# Patient Record
Sex: FEMALE | Employment: FULL TIME | URBAN - METROPOLITAN AREA
[De-identification: names, ages, dates, MRNs, and addresses within clinical notes are randomized per-mention and may not be internally consistent; named-entity substitution may affect disease eponyms.]

---

## 2020-02-17 PROBLEM — Z34.00 SUPERVISION OF NORMAL FIRST PREGNANCY (HCC): Status: ACTIVE | Noted: 2020-02-17

## 2020-02-17 PROBLEM — Z34.00 SUPERVISION OF NORMAL FIRST PREGNANCY: Status: ACTIVE | Noted: 2020-02-17

## 2020-06-03 PROBLEM — Z67.91 RH NEGATIVE STATUS DURING PREGNANCY: Status: ACTIVE | Noted: 2020-06-03

## 2020-06-03 PROBLEM — O26.899 RH NEGATIVE STATUS DURING PREGNANCY: Status: ACTIVE | Noted: 2020-06-03

## 2020-06-03 PROBLEM — Z67.91 RH NEGATIVE STATUS DURING PREGNANCY (HCC): Status: ACTIVE | Noted: 2020-06-03

## 2020-06-03 PROBLEM — O26.899 RH NEGATIVE STATUS DURING PREGNANCY (HCC): Status: ACTIVE | Noted: 2020-06-03

## 2020-08-21 ENCOUNTER — APPOINTMENT (OUTPATIENT)
Dept: OBGYN CLINIC | Facility: HOSPITAL | Age: 25
End: 2020-08-21
Payer: COMMERCIAL

## 2020-08-21 ENCOUNTER — TELEPHONE (OUTPATIENT)
Dept: OBGYN UNIT | Facility: HOSPITAL | Age: 25
End: 2020-08-21

## 2020-08-21 ENCOUNTER — HOSPITAL ENCOUNTER (INPATIENT)
Facility: HOSPITAL | Age: 25
LOS: 2 days | Discharge: HOME | End: 2020-08-23
Attending: OBSTETRICS & GYNECOLOGY | Admitting: OBSTETRICS & GYNECOLOGY
Payer: COMMERCIAL

## 2020-08-21 ENCOUNTER — ANESTHESIA (OUTPATIENT)
Dept: OBGYN UNIT | Facility: HOSPITAL | Age: 25
End: 2020-08-21
Payer: COMMERCIAL

## 2020-08-21 ENCOUNTER — ANESTHESIA EVENT (OUTPATIENT)
Dept: OBGYN UNIT | Facility: HOSPITAL | Age: 25
End: 2020-08-21
Payer: COMMERCIAL

## 2020-08-21 PROBLEM — Z34.90 PREGNANCY: Status: ACTIVE | Noted: 2020-08-21

## 2020-08-21 PROBLEM — Z34.90 PREGNANCY (HCC): Status: ACTIVE | Noted: 2020-08-21

## 2020-08-21 LAB
ANTIBODY SCREEN: NEGATIVE
BASOPHILS # BLD AUTO: 0.06 X10(3) UL (ref 0–0.2)
BASOPHILS NFR BLD AUTO: 0.7 %
DEPRECATED RDW RBC AUTO: 46.5 FL (ref 35.1–46.3)
EOSINOPHIL # BLD AUTO: 0.05 X10(3) UL (ref 0–0.7)
EOSINOPHIL NFR BLD AUTO: 0.6 %
ERYTHROCYTE [DISTWIDTH] IN BLOOD BY AUTOMATED COUNT: 13.8 % (ref 11–15)
HCT VFR BLD AUTO: 36.1 % (ref 35–48)
HGB BLD-MCNC: 12.2 G/DL (ref 12–16)
IMM GRANULOCYTES # BLD AUTO: 0.08 X10(3) UL (ref 0–1)
IMM GRANULOCYTES NFR BLD: 1 %
LYMPHOCYTES # BLD AUTO: 2.01 X10(3) UL (ref 1–4)
LYMPHOCYTES NFR BLD AUTO: 24.1 %
MCH RBC QN AUTO: 31.5 PG (ref 26–34)
MCHC RBC AUTO-ENTMCNC: 33.8 G/DL (ref 31–37)
MCV RBC AUTO: 93.3 FL (ref 80–100)
MONOCYTES # BLD AUTO: 0.55 X10(3) UL (ref 0.1–1)
MONOCYTES NFR BLD AUTO: 6.6 %
NEUTROPHILS # BLD AUTO: 5.58 X10 (3) UL (ref 1.5–7.7)
NEUTROPHILS # BLD AUTO: 5.58 X10(3) UL (ref 1.5–7.7)
NEUTROPHILS NFR BLD AUTO: 67 %
PLATELET # BLD AUTO: 191 10(3)UL (ref 150–450)
RBC # BLD AUTO: 3.87 X10(6)UL (ref 3.8–5.3)
RH BLOOD TYPE: POSITIVE
SARS-COV-2 RNA RESP QL NAA+PROBE: NOT DETECTED
T PALLIDUM AB SER QL IA: NONREACTIVE
WBC # BLD AUTO: 8.3 X10(3) UL (ref 4–11)

## 2020-08-21 PROCEDURE — 85025 COMPLETE CBC W/AUTO DIFF WBC: CPT | Performed by: OBSTETRICS & GYNECOLOGY

## 2020-08-21 PROCEDURE — 0KQM0ZZ REPAIR PERINEUM MUSCLE, OPEN APPROACH: ICD-10-PCS | Performed by: OBSTETRICS & GYNECOLOGY

## 2020-08-21 PROCEDURE — 86850 RBC ANTIBODY SCREEN: CPT | Performed by: OBSTETRICS & GYNECOLOGY

## 2020-08-21 PROCEDURE — 86901 BLOOD TYPING SEROLOGIC RH(D): CPT | Performed by: OBSTETRICS & GYNECOLOGY

## 2020-08-21 PROCEDURE — 86900 BLOOD TYPING SEROLOGIC ABO: CPT | Performed by: OBSTETRICS & GYNECOLOGY

## 2020-08-21 PROCEDURE — 86780 TREPONEMA PALLIDUM: CPT | Performed by: OBSTETRICS & GYNECOLOGY

## 2020-08-21 PROCEDURE — 3E033VJ INTRODUCTION OF OTHER HORMONE INTO PERIPHERAL VEIN, PERCUTANEOUS APPROACH: ICD-10-PCS | Performed by: OBSTETRICS & GYNECOLOGY

## 2020-08-21 RX ORDER — IBUPROFEN 600 MG/1
600 TABLET ORAL EVERY 6 HOURS PRN
Status: DISCONTINUED | OUTPATIENT
Start: 2020-08-21 | End: 2020-08-21

## 2020-08-21 RX ORDER — TRISODIUM CITRATE DIHYDRATE AND CITRIC ACID MONOHYDRATE 500; 334 MG/5ML; MG/5ML
30 SOLUTION ORAL AS NEEDED
Status: DISCONTINUED | OUTPATIENT
Start: 2020-08-21 | End: 2020-08-21

## 2020-08-21 RX ORDER — AMMONIA INHALANTS 0.04 G/.3ML
0.3 INHALANT RESPIRATORY (INHALATION) AS NEEDED
Status: DISCONTINUED | OUTPATIENT
Start: 2020-08-21 | End: 2020-08-21

## 2020-08-21 RX ORDER — TERBUTALINE SULFATE 1 MG/ML
0.25 INJECTION, SOLUTION SUBCUTANEOUS AS NEEDED
Status: DISCONTINUED | OUTPATIENT
Start: 2020-08-21 | End: 2020-08-21

## 2020-08-21 RX ORDER — DOCUSATE SODIUM 100 MG/1
100 CAPSULE, LIQUID FILLED ORAL
Status: DISCONTINUED | OUTPATIENT
Start: 2020-08-21 | End: 2020-08-23

## 2020-08-21 RX ORDER — SIMETHICONE 80 MG
80 TABLET,CHEWABLE ORAL 3 TIMES DAILY PRN
Status: DISCONTINUED | OUTPATIENT
Start: 2020-08-21 | End: 2020-08-23

## 2020-08-21 RX ORDER — ONDANSETRON 2 MG/ML
4 INJECTION INTRAMUSCULAR; INTRAVENOUS EVERY 6 HOURS PRN
Status: DISCONTINUED | OUTPATIENT
Start: 2020-08-21 | End: 2020-08-21

## 2020-08-21 RX ORDER — ACETAMINOPHEN 500 MG
500 TABLET ORAL EVERY 6 HOURS PRN
Status: DISCONTINUED | OUTPATIENT
Start: 2020-08-21 | End: 2020-08-21

## 2020-08-21 RX ORDER — SODIUM CHLORIDE, SODIUM LACTATE, POTASSIUM CHLORIDE, CALCIUM CHLORIDE 600; 310; 30; 20 MG/100ML; MG/100ML; MG/100ML; MG/100ML
INJECTION, SOLUTION INTRAVENOUS CONTINUOUS
Status: DISCONTINUED | OUTPATIENT
Start: 2020-08-21 | End: 2020-08-21

## 2020-08-21 RX ORDER — ACETAMINOPHEN 325 MG/1
650 TABLET ORAL EVERY 6 HOURS PRN
Status: DISCONTINUED | OUTPATIENT
Start: 2020-08-21 | End: 2020-08-23

## 2020-08-21 RX ORDER — BISACODYL 10 MG
10 SUPPOSITORY, RECTAL RECTAL ONCE AS NEEDED
Status: DISCONTINUED | OUTPATIENT
Start: 2020-08-21 | End: 2020-08-23

## 2020-08-21 RX ORDER — EPHEDRINE SULFATE/0.9% NACL/PF 25 MG/5 ML
5 SYRINGE (ML) INTRAVENOUS AS NEEDED
Status: DISCONTINUED | OUTPATIENT
Start: 2020-08-21 | End: 2020-08-21

## 2020-08-21 RX ORDER — DIPHENHYDRAMINE HYDROCHLORIDE 50 MG/ML
12.5 INJECTION INTRAMUSCULAR; INTRAVENOUS EVERY 4 HOURS PRN
Status: DISCONTINUED | OUTPATIENT
Start: 2020-08-21 | End: 2020-08-21

## 2020-08-21 RX ORDER — IBUPROFEN 600 MG/1
600 TABLET ORAL EVERY 6 HOURS
Status: DISCONTINUED | OUTPATIENT
Start: 2020-08-21 | End: 2020-08-23

## 2020-08-21 RX ORDER — DEXTROSE, SODIUM CHLORIDE, SODIUM LACTATE, POTASSIUM CHLORIDE, AND CALCIUM CHLORIDE 5; .6; .31; .03; .02 G/100ML; G/100ML; G/100ML; G/100ML; G/100ML
INJECTION, SOLUTION INTRAVENOUS AS NEEDED
Status: DISCONTINUED | OUTPATIENT
Start: 2020-08-21 | End: 2020-08-21

## 2020-08-21 NOTE — PROGRESS NOTES
Pt is a 31year old female admitted to 114/114-A. Patient presents with:  Scheduled Induction     Pt is  40w0d intra-uterine pregnancy. History obtained, consents signed. Oriented to room, staff, and plan of care.

## 2020-08-21 NOTE — ANESTHESIA PROCEDURE NOTES
Labor Analgesia  Performed by: Joanie Green MD  Authorized by: Joanie Green MD       General Information and Staff    Start Time:  8/21/2020 11:50 AM  End Time:  8/21/2020 12:05 PM  Anesthesiologist:  Joanie Green MD  Performed by:   Anesthes

## 2020-08-21 NOTE — L&D DELIVERY NOTE
Cassie Pham, Girl [[id number]]    Labor Events     labor?:  No   steroids?:  None  Antibiotics received during labor?:  No  Antibiotics (enter # doses in comment):  none  Rupture date/time:  2020 1010     Rupture type:  AROM  Fluid color: Scoring Key:     0 1 2    Skin color Blue or pale Acrocyanotic Completely pink    Heart rate Absent <100 bpm >100 bpm    Reflex irritability No response Grimace Cry or active withdrawal    Muscle tone Limp Some flexion Active motion    Respiratory effort A and/or banked. The placenta delivered with gentle manual traction on the umbilical cord. Placenta was noted to be intact with a normal 3 vessel cord. The cervix and vagina were inspected.   A 2nd degree perineal and right periurethral laceration was note

## 2020-08-21 NOTE — PLAN OF CARE
Problem: Patient/Family Goals  Goal: Patient/Family Long Term Goal  Description  Patient's Long Term Goal:   Uncomplicated vaginal delivery    Interventions:  VS per protocol  I&O  Ice chips and sips as tolerated  EFM per protocol  Maintain IV as ordered

## 2020-08-21 NOTE — ANESTHESIA PREPROCEDURE EVALUATION
PRE-OP EVALUATION    Patient Name: Jules Young    Pre-op Diagnosis: * No surgery found *    * No surgery found *    * Surgery not found *    Pre-op vitals reviewed.   Temp: 98 °F (36.7 °C)  Pulse: 72  Resp: 16  BP: 124/60     Body mass index is 28.7 kg/m² complications         GI/Hepatic/Renal    Negative GI/hepatic/renal ROS. Cardiovascular    Negative cardiovascular ROS. Endo/Other    Negative endo/other ROS.

## 2020-08-21 NOTE — H&P
174 [facility] Patient Status:  Inpatient    3/16/1995 MRN [id number]   Location [facility] Attending Hermilo Yip MD   Hosp Day # 0 PCP No primary care provider on file.      SUBJECTIVE nontender   Fetal Surveillance:  130s, reactive  q 2-3      Cervix: 3/60/-2  AROM clear     Lab Review:    Admission on 08/21/2020   Component Date Value   • Rapid SARS-CoV-2 by PCR 08/21/2020 Not Detected    • WBC 08/21/2020 8.3    • RBC 08/21/2020 3.87

## 2020-08-22 PROBLEM — Z34.00 SUPERVISION OF NORMAL FIRST PREGNANCY: Status: RESOLVED | Noted: 2020-02-17 | Resolved: 2020-08-21

## 2020-08-22 PROBLEM — Z34.00 SUPERVISION OF NORMAL FIRST PREGNANCY (HCC): Status: RESOLVED | Noted: 2020-02-17 | Resolved: 2020-08-21

## 2020-08-22 LAB
BASOPHILS # BLD AUTO: 0.04 X10(3) UL (ref 0–0.2)
BASOPHILS NFR BLD AUTO: 0.3 %
DEPRECATED RDW RBC AUTO: 46.7 FL (ref 35.1–46.3)
EOSINOPHIL # BLD AUTO: 0.09 X10(3) UL (ref 0–0.7)
EOSINOPHIL NFR BLD AUTO: 0.7 %
ERYTHROCYTE [DISTWIDTH] IN BLOOD BY AUTOMATED COUNT: 13.7 % (ref 11–15)
HCT VFR BLD AUTO: 28.3 % (ref 35–48)
HGB BLD-MCNC: 9.4 G/DL (ref 12–16)
IMM GRANULOCYTES # BLD AUTO: 0.12 X10(3) UL (ref 0–1)
IMM GRANULOCYTES NFR BLD: 1 %
LYMPHOCYTES # BLD AUTO: 2.42 X10(3) UL (ref 1–4)
LYMPHOCYTES NFR BLD AUTO: 20.1 %
MCH RBC QN AUTO: 31.1 PG (ref 26–34)
MCHC RBC AUTO-ENTMCNC: 33.2 G/DL (ref 31–37)
MCV RBC AUTO: 93.7 FL (ref 80–100)
MONOCYTES # BLD AUTO: 1.05 X10(3) UL (ref 0.1–1)
MONOCYTES NFR BLD AUTO: 8.7 %
NEUTROPHILS # BLD AUTO: 8.3 X10 (3) UL (ref 1.5–7.7)
NEUTROPHILS # BLD AUTO: 8.3 X10(3) UL (ref 1.5–7.7)
NEUTROPHILS NFR BLD AUTO: 69.2 %
PLATELET # BLD AUTO: 156 10(3)UL (ref 150–450)
RBC # BLD AUTO: 3.02 X10(6)UL (ref 3.8–5.3)
WBC # BLD AUTO: 12 X10(3) UL (ref 4–11)

## 2020-08-22 PROCEDURE — 85025 COMPLETE CBC W/AUTO DIFF WBC: CPT | Performed by: OBSTETRICS & GYNECOLOGY

## 2020-08-22 NOTE — PROGRESS NOTES
[facility]  Post-Partum Vaginal Delivery Progress Note    Chris Coleparvin Patient Status:  Inpatient    3/16/1995 MRN [id number]   [facility] 2SW-J Attending Ambreen Quiroga MD   Hosp Day # 1 PCP No primary care provider on file.

## 2020-08-22 NOTE — PROGRESS NOTES
Labor Analgesia Follow Up Note    Patient underwent epidural anesthesia for labor analgesia,    Placenta Date/Time: 8/21/2020  6:05 PM    Delivery Date/Time[de-identified] 8/21/2020  6:01 PM    /67 (BP Location: Left arm)   Pulse 70   Temp 97.8 °F (36.6 °C) (Oral

## 2020-08-22 NOTE — PROGRESS NOTES
PCT CALLED THIS RN FOR HELP WHILE PT WAS ON THE TOILET HAVING MODERATE AMOUNT OF BLEEDING. PT ABLE TO VOID. RN CLEANED PT UP AND ASSISTED PT BACK TO BED. FUNDAL CHECK COMPLETED WHEN PT GOT BACK TO BED. MIDLINE, U/1 FIRM.  NO TRICKLING OR BLEEDING NOTED AT T

## 2020-08-23 VITALS
RESPIRATION RATE: 18 BRPM | BODY MASS INDEX: 28.63 KG/M2 | WEIGHT: 200 LBS | OXYGEN SATURATION: 98 % | TEMPERATURE: 98 F | SYSTOLIC BLOOD PRESSURE: 110 MMHG | HEART RATE: 75 BPM | HEIGHT: 70 IN | DIASTOLIC BLOOD PRESSURE: 63 MMHG

## 2020-08-23 NOTE — PROGRESS NOTES
NURSING DISCHARGE NOTE    Discharged Home via Wheelchair. Accompanied by Spouse  Belongings Taken by patient/family  Verbalized good understanding of all D/C instructions. Tanika Renae

## 2020-08-23 NOTE — PROGRESS NOTES
[facility]  Post-Partum Vaginal Delivery Progress Note    Trish Veloz Patient Status:  Inpatient    3/16/1995 MRN [id number]   [facility] 2SW-J Attending Valentino Shank, MD   Hosp Day # 2 PCP No primary care provider on file.

## 2020-08-25 ENCOUNTER — TELEPHONE (OUTPATIENT)
Dept: OBGYN UNIT | Facility: HOSPITAL | Age: 25
End: 2020-08-25

## 2020-08-25 NOTE — PROGRESS NOTES
Juan Obando Call completed: Mom reports that she and infant are doing well. Has had pediatrician F/U visit. Reminded to schedule postpartum follow up visit. No complaints of PPD. Reviewed basic self and infant care.    Encouraged to follow up

## 2020-08-26 ENCOUNTER — ANESTHESIA EVENT (OUTPATIENT)
Dept: SURGERY | Facility: HOSPITAL | Age: 25
End: 2020-08-26
Payer: COMMERCIAL

## 2020-08-26 ENCOUNTER — NURSE ONLY (OUTPATIENT)
Dept: LACTATION | Facility: HOSPITAL | Age: 25
End: 2020-08-26
Attending: OBSTETRICS & GYNECOLOGY
Payer: COMMERCIAL

## 2020-08-26 ENCOUNTER — ANESTHESIA (OUTPATIENT)
Dept: SURGERY | Facility: HOSPITAL | Age: 25
End: 2020-08-26
Payer: COMMERCIAL

## 2020-08-26 ENCOUNTER — HOSPITAL ENCOUNTER (OUTPATIENT)
Facility: HOSPITAL | Age: 25
Setting detail: HOSPITAL OUTPATIENT SURGERY
Discharge: HOME | End: 2020-08-27
Attending: OBSTETRICS & GYNECOLOGY | Admitting: OBSTETRICS & GYNECOLOGY
Payer: COMMERCIAL

## 2020-08-26 LAB — SARS-COV-2 RNA RESP QL NAA+PROBE: NOT DETECTED

## 2020-08-26 PROCEDURE — 0W9N0ZZ DRAINAGE OF FEMALE PERINEUM, OPEN APPROACH: ICD-10-PCS | Performed by: OBSTETRICS & GYNECOLOGY

## 2020-08-26 PROCEDURE — 0JBB0ZZ EXCISION OF PERINEUM SUBCUTANEOUS TISSUE AND FASCIA, OPEN APPROACH: ICD-10-PCS | Performed by: OBSTETRICS & GYNECOLOGY

## 2020-08-26 PROCEDURE — 99214 OFFICE O/P EST MOD 30 MIN: CPT

## 2020-08-26 RX ORDER — MEPERIDINE HYDROCHLORIDE 25 MG/ML
12.5 INJECTION INTRAMUSCULAR; INTRAVENOUS; SUBCUTANEOUS AS NEEDED
Status: DISCONTINUED | OUTPATIENT
Start: 2020-08-26 | End: 2020-08-27

## 2020-08-26 RX ORDER — ONDANSETRON 2 MG/ML
INJECTION INTRAMUSCULAR; INTRAVENOUS AS NEEDED
Status: DISCONTINUED | OUTPATIENT
Start: 2020-08-26 | End: 2020-08-26 | Stop reason: SURG

## 2020-08-26 RX ORDER — ACETAMINOPHEN 500 MG
1000 TABLET ORAL ONCE AS NEEDED
Status: ACTIVE | OUTPATIENT
Start: 2020-08-26 | End: 2020-08-26

## 2020-08-26 RX ORDER — HYDROCODONE BITARTRATE AND ACETAMINOPHEN 5; 325 MG/1; MG/1
1 TABLET ORAL AS NEEDED
Status: DISCONTINUED | OUTPATIENT
Start: 2020-08-26 | End: 2020-08-27

## 2020-08-26 RX ORDER — SODIUM CHLORIDE, SODIUM LACTATE, POTASSIUM CHLORIDE, CALCIUM CHLORIDE 600; 310; 30; 20 MG/100ML; MG/100ML; MG/100ML; MG/100ML
INJECTION, SOLUTION INTRAVENOUS CONTINUOUS
Status: DISCONTINUED | OUTPATIENT
Start: 2020-08-26 | End: 2020-08-27

## 2020-08-26 RX ORDER — HYDROCODONE BITARTRATE AND ACETAMINOPHEN 5; 325 MG/1; MG/1
1-2 TABLET ORAL EVERY 4 HOURS PRN
Qty: 12 TABLET | Refills: 0 | Status: SHIPPED | OUTPATIENT
Start: 2020-08-26 | End: 2020-09-04

## 2020-08-26 RX ORDER — KETOROLAC TROMETHAMINE 30 MG/ML
INJECTION, SOLUTION INTRAMUSCULAR; INTRAVENOUS AS NEEDED
Status: DISCONTINUED | OUTPATIENT
Start: 2020-08-26 | End: 2020-08-26 | Stop reason: SURG

## 2020-08-26 RX ORDER — DIPHENHYDRAMINE HYDROCHLORIDE 50 MG/ML
12.5 INJECTION INTRAMUSCULAR; INTRAVENOUS AS NEEDED
Status: DISCONTINUED | OUTPATIENT
Start: 2020-08-26 | End: 2020-08-27

## 2020-08-26 RX ORDER — METOCLOPRAMIDE HYDROCHLORIDE 5 MG/ML
10 INJECTION INTRAMUSCULAR; INTRAVENOUS AS NEEDED
Status: DISCONTINUED | OUTPATIENT
Start: 2020-08-26 | End: 2020-08-27

## 2020-08-26 RX ORDER — KETAMINE HYDROCHLORIDE 50 MG/ML
INJECTION, SOLUTION, CONCENTRATE INTRAMUSCULAR; INTRAVENOUS AS NEEDED
Status: DISCONTINUED | OUTPATIENT
Start: 2020-08-26 | End: 2020-08-26 | Stop reason: SURG

## 2020-08-26 RX ORDER — LIDOCAINE HYDROCHLORIDE 10 MG/ML
INJECTION, SOLUTION EPIDURAL; INFILTRATION; INTRACAUDAL; PERINEURAL AS NEEDED
Status: DISCONTINUED | OUTPATIENT
Start: 2020-08-26 | End: 2020-08-26 | Stop reason: SURG

## 2020-08-26 RX ORDER — MIDAZOLAM HYDROCHLORIDE 1 MG/ML
1 INJECTION INTRAMUSCULAR; INTRAVENOUS EVERY 5 MIN PRN
Status: DISCONTINUED | OUTPATIENT
Start: 2020-08-26 | End: 2020-08-27

## 2020-08-26 RX ORDER — MIDAZOLAM HYDROCHLORIDE 1 MG/ML
INJECTION INTRAMUSCULAR; INTRAVENOUS
Status: COMPLETED
Start: 2020-08-26 | End: 2020-08-26

## 2020-08-26 RX ORDER — ACETAMINOPHEN 500 MG
1000 TABLET ORAL ONCE
Status: DISCONTINUED | OUTPATIENT
Start: 2020-08-26 | End: 2020-08-26 | Stop reason: HOSPADM

## 2020-08-26 RX ORDER — HYDROCODONE BITARTRATE AND ACETAMINOPHEN 5; 325 MG/1; MG/1
2 TABLET ORAL AS NEEDED
Status: DISCONTINUED | OUTPATIENT
Start: 2020-08-26 | End: 2020-08-27

## 2020-08-26 RX ORDER — BUPIVACAINE HYDROCHLORIDE 5 MG/ML
INJECTION, SOLUTION EPIDURAL; INTRACAUDAL AS NEEDED
Status: DISCONTINUED | OUTPATIENT
Start: 2020-08-26 | End: 2020-08-26 | Stop reason: HOSPADM

## 2020-08-26 RX ORDER — ACETAMINOPHEN 500 MG
1000 TABLET ORAL ONCE
Status: CANCELLED | OUTPATIENT
Start: 2020-08-26 | End: 2020-08-26

## 2020-08-26 RX ORDER — CEFAZOLIN SODIUM/WATER 2 G/20 ML
2 SYRINGE (ML) INTRAVENOUS ONCE
Status: COMPLETED | OUTPATIENT
Start: 2020-08-26 | End: 2020-08-26

## 2020-08-26 RX ORDER — DEXAMETHASONE SODIUM PHOSPHATE 4 MG/ML
VIAL (ML) INJECTION AS NEEDED
Status: DISCONTINUED | OUTPATIENT
Start: 2020-08-26 | End: 2020-08-26 | Stop reason: SURG

## 2020-08-26 RX ORDER — ONDANSETRON 2 MG/ML
4 INJECTION INTRAMUSCULAR; INTRAVENOUS AS NEEDED
Status: DISCONTINUED | OUTPATIENT
Start: 2020-08-26 | End: 2020-08-27

## 2020-08-26 RX ORDER — HYDROMORPHONE HYDROCHLORIDE 1 MG/ML
INJECTION, SOLUTION INTRAMUSCULAR; INTRAVENOUS; SUBCUTANEOUS
Status: COMPLETED
Start: 2020-08-26 | End: 2020-08-26

## 2020-08-26 RX ORDER — HYDROMORPHONE HYDROCHLORIDE 1 MG/ML
0.4 INJECTION, SOLUTION INTRAMUSCULAR; INTRAVENOUS; SUBCUTANEOUS EVERY 5 MIN PRN
Status: DISCONTINUED | OUTPATIENT
Start: 2020-08-26 | End: 2020-08-27

## 2020-08-26 RX ORDER — NALOXONE HYDROCHLORIDE 0.4 MG/ML
80 INJECTION, SOLUTION INTRAMUSCULAR; INTRAVENOUS; SUBCUTANEOUS AS NEEDED
Status: DISCONTINUED | OUTPATIENT
Start: 2020-08-26 | End: 2020-08-27

## 2020-08-26 RX ORDER — CEFAZOLIN SODIUM/WATER 2 G/20 ML
SYRINGE (ML) INTRAVENOUS
Status: DISCONTINUED
Start: 2020-08-26 | End: 2020-08-27

## 2020-08-26 RX ADMIN — KETAMINE HYDROCHLORIDE 25 MG: 50 INJECTION, SOLUTION, CONCENTRATE INTRAMUSCULAR; INTRAVENOUS at 21:07:00

## 2020-08-26 RX ADMIN — CEFAZOLIN SODIUM/WATER 2 G: 2 G/20 ML SYRINGE (ML) INTRAVENOUS at 21:10:00

## 2020-08-26 RX ADMIN — ONDANSETRON 4 MG: 2 INJECTION INTRAMUSCULAR; INTRAVENOUS at 21:45:00

## 2020-08-26 RX ADMIN — KETAMINE HYDROCHLORIDE 25 MG: 50 INJECTION, SOLUTION, CONCENTRATE INTRAMUSCULAR; INTRAVENOUS at 21:42:00

## 2020-08-26 RX ADMIN — KETOROLAC TROMETHAMINE 30 MG: 30 INJECTION, SOLUTION INTRAMUSCULAR; INTRAVENOUS at 21:38:00

## 2020-08-26 RX ADMIN — LIDOCAINE HYDROCHLORIDE 50 MG: 10 INJECTION, SOLUTION EPIDURAL; INFILTRATION; INTRACAUDAL; PERINEURAL at 21:07:00

## 2020-08-26 RX ADMIN — DEXAMETHASONE SODIUM PHOSPHATE 4 MG: 4 MG/ML VIAL (ML) INJECTION at 21:07:00

## 2020-08-26 NOTE — PROGRESS NOTES
LACTATION NOTE - MOTHER      Evaluation Type: Outpatient Initial    Problems identified  Problems identified: Knowledge deficit; Unable to acheive sustained latch;Milk supply WNL; Nipple pain  Problems Identified Other: Ernesto Jimenez presents with Tiffany at 5 days of shield

## 2020-08-27 VITALS
WEIGHT: 186.31 LBS | OXYGEN SATURATION: 98 % | DIASTOLIC BLOOD PRESSURE: 72 MMHG | BODY MASS INDEX: 26.67 KG/M2 | RESPIRATION RATE: 14 BRPM | HEIGHT: 70 IN | HEART RATE: 64 BPM | SYSTOLIC BLOOD PRESSURE: 118 MMHG | TEMPERATURE: 98 F

## 2020-08-27 RX ORDER — HYDROCODONE BITARTRATE AND ACETAMINOPHEN 5; 325 MG/1; MG/1
TABLET ORAL
Status: COMPLETED
Start: 2020-08-27 | End: 2020-08-27

## 2020-08-27 NOTE — ANESTHESIA POSTPROCEDURE EVALUATION
10 [facility] Patient Status:  Hospital Outpatient Surgery   Age/Gender 31year old female MRN [id number]   [facility] SURGERY Attending Monica Reddy MD   Hosp Day # 0 PCP No primary care provider on file.        Anesthesia Pos

## 2020-08-27 NOTE — ANESTHESIA PROCEDURE NOTES
Airway  Urgency: elective    Airway not difficult    General Information and Staff    Patient location during procedure: OR  Anesthesiologist: Ernst Fisher MD  Performed: anesthesiologist     Indications and Patient Condition  Indications for airway manag

## 2020-08-27 NOTE — ANESTHESIA PREPROCEDURE EVALUATION
PRE-OP EVALUATION    Patient Name: Esvin Mtz    Pre-op Diagnosis: Tear of vaginal muscle [S39.013A]    Procedure(s):  VAGINAL TEAR REPAIR    Surgeon(s) and Role:     Shalini Interiano MD - Primary    Pre-op vitals reviewed.   Temp: 98.3 °F (36.8 °C)  Pulse: 08/22/2020    RBC 3.02 (L) 08/22/2020    HGB 9.4 (L) 08/22/2020    HGB 11.0 (L) 05/29/2020    HCT 28.3 (L) 08/22/2020    HCT 32.7 (L) 05/29/2020    MCV 93.7 08/22/2020    MCH 31.1 08/22/2020    MCHC 33.2 08/22/2020    RDW 13.7 08/22/2020    .0 08/22

## 2020-08-27 NOTE — H&P
PREOPERATIVE HISTORY AND PHYSICAL:    HPI: The patient is a 31year old  PPD5  with second laceration. Pt with severe perineal pain, induration and suture breakdown at site of repair, tension from suture.     She is now scheduled for revision of p surgery which include but are not limited to risks of anesthesia, infection, bleeding, and DVT. Pt verbalizes understanding of the above planned procedure.

## 2020-09-04 ENCOUNTER — NURSE ONLY (OUTPATIENT)
Dept: LACTATION | Facility: HOSPITAL | Age: 25
End: 2020-09-04
Attending: OBSTETRICS & GYNECOLOGY
Payer: COMMERCIAL

## 2020-09-04 VITALS — TEMPERATURE: 98 F

## 2020-09-04 DIAGNOSIS — O92.29 POSTPARTUM NIPPLE PAIN: Primary | ICD-10-CM

## 2020-09-04 PROCEDURE — 99213 OFFICE O/P EST LOW 20 MIN: CPT

## 2020-11-24 RX ORDER — ACETAMINOPHEN 500 MG
1000 TABLET ORAL ONCE
Status: CANCELLED | OUTPATIENT
Start: 2020-11-24 | End: 2020-11-24

## 2020-11-25 ENCOUNTER — LAB ENCOUNTER (OUTPATIENT)
Dept: LAB | Age: 25
End: 2020-11-25
Attending: OBSTETRICS & GYNECOLOGY
Payer: COMMERCIAL

## 2020-11-25 DIAGNOSIS — Z01.818 PREOP TESTING: ICD-10-CM

## 2020-11-25 PROCEDURE — 85025 COMPLETE CBC W/AUTO DIFF WBC: CPT

## 2020-11-25 PROCEDURE — 36415 COLL VENOUS BLD VENIPUNCTURE: CPT

## 2020-11-25 PROCEDURE — 84702 CHORIONIC GONADOTROPIN TEST: CPT

## 2020-11-25 PROCEDURE — 80053 COMPREHEN METABOLIC PANEL: CPT

## 2020-12-01 ENCOUNTER — APPOINTMENT (OUTPATIENT)
Dept: LAB | Age: 25
End: 2020-12-01
Attending: STUDENT IN AN ORGANIZED HEALTH CARE EDUCATION/TRAINING PROGRAM
Payer: COMMERCIAL

## 2020-12-04 ENCOUNTER — HOSPITAL ENCOUNTER (OUTPATIENT)
Facility: HOSPITAL | Age: 25
Setting detail: HOSPITAL OUTPATIENT SURGERY
Discharge: HOME | End: 2020-12-04
Attending: STUDENT IN AN ORGANIZED HEALTH CARE EDUCATION/TRAINING PROGRAM | Admitting: STUDENT IN AN ORGANIZED HEALTH CARE EDUCATION/TRAINING PROGRAM
Payer: COMMERCIAL

## 2020-12-04 ENCOUNTER — HOSPITAL ENCOUNTER (OUTPATIENT)
Dept: ULTRASOUND IMAGING | Facility: HOSPITAL | Age: 25
Discharge: HOME | End: 2020-12-04
Attending: STUDENT IN AN ORGANIZED HEALTH CARE EDUCATION/TRAINING PROGRAM
Payer: COMMERCIAL

## 2020-12-04 ENCOUNTER — ANESTHESIA EVENT (OUTPATIENT)
Dept: SURGERY | Facility: HOSPITAL | Age: 25
End: 2020-12-04
Payer: COMMERCIAL

## 2020-12-04 ENCOUNTER — ANESTHESIA (OUTPATIENT)
Dept: SURGERY | Facility: HOSPITAL | Age: 25
End: 2020-12-04
Payer: COMMERCIAL

## 2020-12-04 VITALS
WEIGHT: 176.81 LBS | SYSTOLIC BLOOD PRESSURE: 116 MMHG | DIASTOLIC BLOOD PRESSURE: 64 MMHG | TEMPERATURE: 99 F | HEIGHT: 70 IN | HEART RATE: 75 BPM | OXYGEN SATURATION: 99 % | RESPIRATION RATE: 18 BRPM | BODY MASS INDEX: 25.31 KG/M2

## 2020-12-04 DIAGNOSIS — O03.4 RETAINED PRODUCTS OF CONCEPTION AFTER MISCARRIAGE: ICD-10-CM

## 2020-12-04 PROCEDURE — 76998 US GUIDE INTRAOP: CPT | Performed by: STUDENT IN AN ORGANIZED HEALTH CARE EDUCATION/TRAINING PROGRAM

## 2020-12-04 PROCEDURE — 88305 TISSUE EXAM BY PATHOLOGIST: CPT | Performed by: STUDENT IN AN ORGANIZED HEALTH CARE EDUCATION/TRAINING PROGRAM

## 2020-12-04 PROCEDURE — 81025 URINE PREGNANCY TEST: CPT | Performed by: STUDENT IN AN ORGANIZED HEALTH CARE EDUCATION/TRAINING PROGRAM

## 2020-12-04 PROCEDURE — 0UDB8ZZ EXTRACTION OF ENDOMETRIUM, VIA NATURAL OR ARTIFICIAL OPENING ENDOSCOPIC: ICD-10-PCS | Performed by: STUDENT IN AN ORGANIZED HEALTH CARE EDUCATION/TRAINING PROGRAM

## 2020-12-04 RX ORDER — DROSPIRENONE AND ETHINYL ESTRADIOL 0.02-3(28)
1 KIT ORAL DAILY
COMMUNITY

## 2020-12-04 RX ORDER — SODIUM CHLORIDE, SODIUM LACTATE, POTASSIUM CHLORIDE, CALCIUM CHLORIDE 600; 310; 30; 20 MG/100ML; MG/100ML; MG/100ML; MG/100ML
INJECTION, SOLUTION INTRAVENOUS CONTINUOUS
Status: DISCONTINUED | OUTPATIENT
Start: 2020-12-04 | End: 2020-12-04

## 2020-12-04 RX ORDER — HYDROCODONE BITARTRATE AND ACETAMINOPHEN 5; 325 MG/1; MG/1
2 TABLET ORAL AS NEEDED
Status: DISCONTINUED | OUTPATIENT
Start: 2020-12-04 | End: 2020-12-04

## 2020-12-04 RX ORDER — DEXAMETHASONE SODIUM PHOSPHATE 4 MG/ML
VIAL (ML) INJECTION AS NEEDED
Status: DISCONTINUED | OUTPATIENT
Start: 2020-12-04 | End: 2020-12-04 | Stop reason: SURG

## 2020-12-04 RX ORDER — MIDAZOLAM HYDROCHLORIDE 1 MG/ML
1 INJECTION INTRAMUSCULAR; INTRAVENOUS EVERY 5 MIN PRN
Status: DISCONTINUED | OUTPATIENT
Start: 2020-12-04 | End: 2020-12-04

## 2020-12-04 RX ORDER — KETOROLAC TROMETHAMINE 30 MG/ML
INJECTION, SOLUTION INTRAMUSCULAR; INTRAVENOUS AS NEEDED
Status: DISCONTINUED | OUTPATIENT
Start: 2020-12-04 | End: 2020-12-04 | Stop reason: SURG

## 2020-12-04 RX ORDER — NALOXONE HYDROCHLORIDE 0.4 MG/ML
80 INJECTION, SOLUTION INTRAMUSCULAR; INTRAVENOUS; SUBCUTANEOUS AS NEEDED
Status: DISCONTINUED | OUTPATIENT
Start: 2020-12-04 | End: 2020-12-04

## 2020-12-04 RX ORDER — ONDANSETRON 2 MG/ML
INJECTION INTRAMUSCULAR; INTRAVENOUS AS NEEDED
Status: DISCONTINUED | OUTPATIENT
Start: 2020-12-04 | End: 2020-12-04 | Stop reason: SURG

## 2020-12-04 RX ORDER — HYDROCODONE BITARTRATE AND ACETAMINOPHEN 5; 325 MG/1; MG/1
1 TABLET ORAL AS NEEDED
Status: DISCONTINUED | OUTPATIENT
Start: 2020-12-04 | End: 2020-12-04

## 2020-12-04 RX ORDER — LIDOCAINE HYDROCHLORIDE 10 MG/ML
INJECTION, SOLUTION EPIDURAL; INFILTRATION; INTRACAUDAL; PERINEURAL AS NEEDED
Status: DISCONTINUED | OUTPATIENT
Start: 2020-12-04 | End: 2020-12-04 | Stop reason: SURG

## 2020-12-04 RX ORDER — ONDANSETRON 2 MG/ML
4 INJECTION INTRAMUSCULAR; INTRAVENOUS AS NEEDED
Status: DISCONTINUED | OUTPATIENT
Start: 2020-12-04 | End: 2020-12-04

## 2020-12-04 RX ORDER — ACETAMINOPHEN 500 MG
1000 TABLET ORAL ONCE AS NEEDED
Status: DISCONTINUED | OUTPATIENT
Start: 2020-12-04 | End: 2020-12-04

## 2020-12-04 RX ORDER — HYDROMORPHONE HYDROCHLORIDE 1 MG/ML
0.4 INJECTION, SOLUTION INTRAMUSCULAR; INTRAVENOUS; SUBCUTANEOUS EVERY 5 MIN PRN
Status: DISCONTINUED | OUTPATIENT
Start: 2020-12-04 | End: 2020-12-04

## 2020-12-04 RX ADMIN — SODIUM CHLORIDE, SODIUM LACTATE, POTASSIUM CHLORIDE, CALCIUM CHLORIDE: 600; 310; 30; 20 INJECTION, SOLUTION INTRAVENOUS at 13:44:00

## 2020-12-04 RX ADMIN — KETOROLAC TROMETHAMINE 30 MG: 30 INJECTION, SOLUTION INTRAMUSCULAR; INTRAVENOUS at 13:42:00

## 2020-12-04 RX ADMIN — LIDOCAINE HYDROCHLORIDE 50 MG: 10 INJECTION, SOLUTION EPIDURAL; INFILTRATION; INTRACAUDAL; PERINEURAL at 13:04:00

## 2020-12-04 RX ADMIN — ONDANSETRON 4 MG: 2 INJECTION INTRAMUSCULAR; INTRAVENOUS at 13:42:00

## 2020-12-04 RX ADMIN — DEXAMETHASONE SODIUM PHOSPHATE 8 MG: 4 MG/ML VIAL (ML) INJECTION at 13:08:00

## 2020-12-04 RX ADMIN — SODIUM CHLORIDE, SODIUM LACTATE, POTASSIUM CHLORIDE, CALCIUM CHLORIDE: 600; 310; 30; 20 INJECTION, SOLUTION INTRAVENOUS at 13:01:00

## 2020-12-04 NOTE — ANESTHESIA POSTPROCEDURE EVALUATION
[facility]    Fadia Johnston Patient Status:  Hospital Outpatient Surgery   Age/Gender 31year old female MRN [id number]   Location [address] [place] [address] Attending Jami Ball MD   Hosp Day # 0 PCP No primary care provider on

## 2020-12-04 NOTE — H&P
[facility]  Gynecology History & Physical      Rosenda Lowry Patient Status:  Hospital Outpatient Surgery    3/16/1995 MRN [id number]   [facility]GERY Attending Kraig Wall MD   Hosp Day # 0 PCP No primary care provider History:  OB History    Para Term  AB Living   1 1 1     1   SAB TAB Ectopic Multiple Live Births         0 1      # Outcome Date GA Lbr Manuel/2nd Weight Sex Delivery Anes PTL Lv   1 Term 20 40w0d 03:49 / 00:42 8 lb 10.3 oz (3.92 kg) F NO file      Food insecurity        Worry: Not on file        Inability: Not on file      Transportation needs        Medical: Not on file        Non-medical: Not on file    Tobacco Use      Smoking status: Never Smoker      Smokeless tobacco: Never Used    S NE 1.77         Recent Results (from the past 4380 hour(s))   COMP METABOLIC PANEL (14)    Collection Time: 11/25/20 10:49 AM   Result Value Ref Range    Glucose 70 70 - 99 mg/dL    Sodium 140 136 - 145 mmol/L    Potassium 3.9 3.5 - 5.1 mmol/L    Chlorid Planned Procedure: operative hysteroscopy D&C under ultrasound guidance    Labs: none    Meds: none    Pranay Sainz MD  12/4/2020

## 2020-12-04 NOTE — ANESTHESIA PREPROCEDURE EVALUATION
PRE-OP EVALUATION    Patient Name: Benedict Osuna    Pre-op Diagnosis: RETAINED PRODUCTS OF CONCEPTION    Procedure(s):  OPERATIVE HYSTEROSCOPY WITH TRUCLEAR AND DILATION AND CURETTAGE WITH ULTRASOUND GUIDANCE    Surgeon(s) and Role:     * Shira 08/26/2020    Obstetrical Laceration Revision and Evacuation of Hematoma   • PARTIAL REMOVAL OF HYMEN  2007   • WISDOM TEETH REMOVED  2012     Social History    Tobacco Use      Smoking status: Never Smoker      Smokeless tobacco: Never Used    Alcohol use risks and benefits, and wishes to proceed as reflected in the signed consent form. Difficulty airway equipment available as needed, may need general anesthesia OETT. Previous anesthesia records reviewed.   Plan/risks discussed with: patient            Casey

## 2020-12-04 NOTE — BRIEF OP NOTE
Pre-Operative Diagnosis: ABNORMAL UTERINE BLEEDING, SUSPECTED RETAINED PRODUCTS OF CONCEPTION     Post-Operative Diagnosis: ABNORMAL UTERINE BLEEDING, SUSPECTED RETAINED PRODUCTS OF CONCEPTION, INTRAUTERINE CALCIFICATIONS     Procedure Performed:   Procedu

## 2020-12-04 NOTE — OPERATIVE REPORT
[facility]  Operative Report      Merwyn Six Patient Status:  Hospital Outpatient Surgery    3/16/1995 MRN [id number]   Location [address] [place] [address] Attending Gage Mckeon MD   Hosp Day # 0 PCP No primary care provide the fundus on ultrasound. 4. Normal appearing proliverative endometrium on the posterior uterine wall. White, dense tissue noted at the fundus with small white suspected calcifications visualized with hysteroscopy.   5. Bilateral ostia visualized and not i dense and avascular. Care was taken to resect the calcifications at the fundus. The ostia were visualized bilaterally. As the fluid deficit approached 2100 mL the procedure was terminated.  Transabdominal ultrasound showed fluid in the pelvis and poor visua

## 2021-10-01 LAB
ANTIBODY SCREEN OB: NEGATIVE
HEPATITIS B SURFACE ANTIGEN OB: NEGATIVE
HIV RESULT OB: NEGATIVE
HIV RESULT OB: NEGATIVE
RAPID PLASMA REAGIN OB: NONREACTIVE
RH FACTOR OB: POSITIVE
RH FACTOR OB: POSITIVE

## 2022-03-03 ENCOUNTER — HOSPITAL ENCOUNTER (OUTPATIENT)
Dept: LAB | Facility: HOSPITAL | Age: 27
Discharge: HOME | End: 2022-03-03
Attending: INTERNAL MEDICINE
Payer: COMMERCIAL

## 2022-03-03 ENCOUNTER — HOSPITAL ENCOUNTER (OUTPATIENT)
Dept: CV DIAGNOSTICS | Facility: HOSPITAL | Age: 27
Discharge: HOME | End: 2022-03-03
Attending: INTERNAL MEDICINE
Payer: COMMERCIAL

## 2022-03-03 DIAGNOSIS — R07.9 CHEST PAIN, UNSPECIFIED TYPE: ICD-10-CM

## 2022-03-03 DIAGNOSIS — Z84.89 FAMILY HISTORY OF EARLY SUDDEN DEATH: ICD-10-CM

## 2022-03-03 DIAGNOSIS — R00.2 PALPITATIONS: ICD-10-CM

## 2022-03-03 LAB
ALBUMIN SERPL-MCNC: 2.9 G/DL (ref 3.4–5)
ALBUMIN/GLOB SERPL: 0.8 {RATIO} (ref 1–2)
ALP LIVER SERPL-CCNC: 56 U/L
ALT SERPL-CCNC: 18 U/L
ANION GAP SERPL CALC-SCNC: 4 MMOL/L (ref 0–18)
AST SERPL-CCNC: 17 U/L (ref 15–37)
BASOPHILS # BLD AUTO: 0.03 X10(3) UL (ref 0–0.2)
BASOPHILS NFR BLD AUTO: 0.4 %
BILIRUB SERPL-MCNC: 0.2 MG/DL (ref 0.1–2)
BUN BLD-MCNC: 9 MG/DL (ref 7–18)
CALCIUM BLD-MCNC: 8.5 MG/DL (ref 8.5–10.1)
CHLORIDE SERPL-SCNC: 108 MMOL/L (ref 98–112)
CO2 SERPL-SCNC: 26 MMOL/L (ref 21–32)
CREAT BLD-MCNC: 0.56 MG/DL
EOSINOPHIL # BLD AUTO: 0.05 X10(3) UL (ref 0–0.7)
EOSINOPHIL NFR BLD AUTO: 0.7 %
ERYTHROCYTE [DISTWIDTH] IN BLOOD BY AUTOMATED COUNT: 12.9 %
FASTING STATUS PATIENT QL REPORTED: NO
GLOBULIN PLAS-MCNC: 3.7 G/DL (ref 2.8–4.4)
GLUCOSE BLD-MCNC: 107 MG/DL (ref 70–99)
HCT VFR BLD AUTO: 35.6 %
HGB BLD-MCNC: 11.9 G/DL
IMM GRANULOCYTES # BLD AUTO: 0.04 X10(3) UL (ref 0–1)
IMM GRANULOCYTES NFR BLD: 0.6 %
LYMPHOCYTES # BLD AUTO: 1.22 X10(3) UL (ref 1–4)
LYMPHOCYTES NFR BLD AUTO: 17.6 %
MCH RBC QN AUTO: 31.1 PG (ref 26–34)
MCHC RBC AUTO-ENTMCNC: 33.4 G/DL (ref 31–37)
MCV RBC AUTO: 93 FL
MONOCYTES # BLD AUTO: 0.28 X10(3) UL (ref 0.1–1)
MONOCYTES NFR BLD AUTO: 4 %
NEUTROPHILS # BLD AUTO: 5.3 X10 (3) UL (ref 1.5–7.7)
NEUTROPHILS # BLD AUTO: 5.3 X10(3) UL (ref 1.5–7.7)
NEUTROPHILS NFR BLD AUTO: 76.7 %
OSMOLALITY SERPL CALC.SUM OF ELEC: 285 MOSM/KG (ref 275–295)
PLATELET # BLD AUTO: 182 10(3)UL (ref 150–450)
POTASSIUM SERPL-SCNC: 3.5 MMOL/L (ref 3.5–5.1)
PROT SERPL-MCNC: 6.6 G/DL (ref 6.4–8.2)
RBC # BLD AUTO: 3.83 X10(6)UL
SODIUM SERPL-SCNC: 138 MMOL/L (ref 136–145)
TSI SER-ACNC: 0.75 MIU/ML (ref 0.36–3.74)
WBC # BLD AUTO: 6.9 X10(3) UL (ref 4–11)

## 2022-03-03 PROCEDURE — 84443 ASSAY THYROID STIM HORMONE: CPT | Performed by: INTERNAL MEDICINE

## 2022-03-03 PROCEDURE — 93306 TTE W/DOPPLER COMPLETE: CPT | Performed by: INTERNAL MEDICINE

## 2022-03-03 PROCEDURE — 80053 COMPREHEN METABOLIC PANEL: CPT | Performed by: INTERNAL MEDICINE

## 2022-03-03 PROCEDURE — 85025 COMPLETE CBC W/AUTO DIFF WBC: CPT | Performed by: INTERNAL MEDICINE

## 2022-03-03 PROCEDURE — 36415 COLL VENOUS BLD VENIPUNCTURE: CPT | Performed by: INTERNAL MEDICINE

## 2022-03-14 LAB
HIV RESULT OB: NEGATIVE
HIV RESULT OB: NEGATIVE

## 2022-05-03 NOTE — OPERATIVE REPORT
Operative Note    Preop diagnosis: Perineal pain postpartum day 5     Second degree perineal laceration with suture breakdown   Postop diagnosis: Same     Perineal hematoma  Procedure:  Evacuation of perineal hematoma     Debridement      Repair/revision o none

## 2022-05-09 LAB
AMB EXT STREP B CULTURE: NEGATIVE
AMB EXT STREP B CULTURE: NEGATIVE
STREP GP B CULT OB: NEGATIVE
STREP GP B CULT OB: NEGATIVE

## 2022-05-24 ENCOUNTER — HOSPITAL ENCOUNTER (INPATIENT)
Facility: HOSPITAL | Age: 27
LOS: 1 days | Discharge: HOME | End: 2022-05-25
Attending: OBSTETRICS & GYNECOLOGY | Admitting: OBSTETRICS & GYNECOLOGY
Payer: COMMERCIAL

## 2022-05-24 ENCOUNTER — ANESTHESIA (OUTPATIENT)
Dept: OBGYN UNIT | Facility: HOSPITAL | Age: 27
End: 2022-05-24
Payer: COMMERCIAL

## 2022-05-24 ENCOUNTER — APPOINTMENT (OUTPATIENT)
Dept: OBGYN CLINIC | Facility: HOSPITAL | Age: 27
End: 2022-05-24
Payer: COMMERCIAL

## 2022-05-24 ENCOUNTER — ANESTHESIA EVENT (OUTPATIENT)
Dept: OBGYN UNIT | Facility: HOSPITAL | Age: 27
End: 2022-05-24
Payer: COMMERCIAL

## 2022-05-24 LAB
ANTIBODY SCREEN: NEGATIVE
BASOPHILS # BLD AUTO: 0.03 X10(3) UL (ref 0–0.2)
BASOPHILS NFR BLD AUTO: 0.5 %
EOSINOPHIL # BLD AUTO: 0.07 X10(3) UL (ref 0–0.7)
EOSINOPHIL NFR BLD AUTO: 1.2 %
ERYTHROCYTE [DISTWIDTH] IN BLOOD BY AUTOMATED COUNT: 13.2 %
HCT VFR BLD AUTO: 36.3 %
HGB BLD-MCNC: 12.1 G/DL
IMM GRANULOCYTES # BLD AUTO: 0.06 X10(3) UL (ref 0–1)
IMM GRANULOCYTES NFR BLD: 1 %
LYMPHOCYTES # BLD AUTO: 1.49 X10(3) UL (ref 1–4)
LYMPHOCYTES NFR BLD AUTO: 24.9 %
MCH RBC QN AUTO: 30.7 PG (ref 26–34)
MCHC RBC AUTO-ENTMCNC: 33.3 G/DL (ref 31–37)
MCV RBC AUTO: 92.1 FL
MONOCYTES # BLD AUTO: 0.4 X10(3) UL (ref 0.1–1)
MONOCYTES NFR BLD AUTO: 6.7 %
NEUTROPHILS # BLD AUTO: 3.94 X10 (3) UL (ref 1.5–7.7)
NEUTROPHILS # BLD AUTO: 3.94 X10(3) UL (ref 1.5–7.7)
NEUTROPHILS NFR BLD AUTO: 65.7 %
PLATELET # BLD AUTO: 150 10(3)UL (ref 150–450)
RBC # BLD AUTO: 3.94 X10(6)UL
RH BLOOD TYPE: POSITIVE
SARS-COV-2 RNA RESP QL NAA+PROBE: NOT DETECTED
T PALLIDUM AB SER QL IA: NONREACTIVE
WBC # BLD AUTO: 6 X10(3) UL (ref 4–11)

## 2022-05-24 PROCEDURE — 0UQMXZZ REPAIR VULVA, EXTERNAL APPROACH: ICD-10-PCS | Performed by: OBSTETRICS & GYNECOLOGY

## 2022-05-24 PROCEDURE — 10907ZC DRAINAGE OF AMNIOTIC FLUID, THERAPEUTIC FROM PRODUCTS OF CONCEPTION, VIA NATURAL OR ARTIFICIAL OPENING: ICD-10-PCS | Performed by: OBSTETRICS & GYNECOLOGY

## 2022-05-24 PROCEDURE — 86780 TREPONEMA PALLIDUM: CPT | Performed by: OBSTETRICS & GYNECOLOGY

## 2022-05-24 PROCEDURE — 0KQM0ZZ REPAIR PERINEUM MUSCLE, OPEN APPROACH: ICD-10-PCS | Performed by: OBSTETRICS & GYNECOLOGY

## 2022-05-24 PROCEDURE — 85025 COMPLETE CBC W/AUTO DIFF WBC: CPT | Performed by: OBSTETRICS & GYNECOLOGY

## 2022-05-24 PROCEDURE — 86850 RBC ANTIBODY SCREEN: CPT | Performed by: OBSTETRICS & GYNECOLOGY

## 2022-05-24 PROCEDURE — 0UQGXZZ REPAIR VAGINA, EXTERNAL APPROACH: ICD-10-PCS | Performed by: OBSTETRICS & GYNECOLOGY

## 2022-05-24 PROCEDURE — 86900 BLOOD TYPING SEROLOGIC ABO: CPT | Performed by: OBSTETRICS & GYNECOLOGY

## 2022-05-24 PROCEDURE — 3E033VJ INTRODUCTION OF OTHER HORMONE INTO PERIPHERAL VEIN, PERCUTANEOUS APPROACH: ICD-10-PCS | Performed by: OBSTETRICS & GYNECOLOGY

## 2022-05-24 PROCEDURE — 86901 BLOOD TYPING SEROLOGIC RH(D): CPT | Performed by: OBSTETRICS & GYNECOLOGY

## 2022-05-24 RX ORDER — DEXTROSE, SODIUM CHLORIDE, SODIUM LACTATE, POTASSIUM CHLORIDE, AND CALCIUM CHLORIDE 5; .6; .31; .03; .02 G/100ML; G/100ML; G/100ML; G/100ML; G/100ML
INJECTION, SOLUTION INTRAVENOUS AS NEEDED
Status: DISCONTINUED | OUTPATIENT
Start: 2022-05-24 | End: 2022-05-24 | Stop reason: HOSPADM

## 2022-05-24 RX ORDER — SODIUM CHLORIDE, SODIUM LACTATE, POTASSIUM CHLORIDE, CALCIUM CHLORIDE 600; 310; 30; 20 MG/100ML; MG/100ML; MG/100ML; MG/100ML
INJECTION, SOLUTION INTRAVENOUS CONTINUOUS
Status: DISCONTINUED | OUTPATIENT
Start: 2022-05-24 | End: 2022-05-24 | Stop reason: HOSPADM

## 2022-05-24 RX ORDER — NALBUPHINE HCL 10 MG/ML
2.5 AMPUL (ML) INJECTION
Status: DISCONTINUED | OUTPATIENT
Start: 2022-05-24 | End: 2022-05-25

## 2022-05-24 RX ORDER — ONDANSETRON 2 MG/ML
4 INJECTION INTRAMUSCULAR; INTRAVENOUS EVERY 6 HOURS PRN
Status: DISCONTINUED | OUTPATIENT
Start: 2022-05-24 | End: 2022-05-24 | Stop reason: HOSPADM

## 2022-05-24 RX ORDER — MULTIVIT-MIN/IRON/FOLIC ACID/K 18-600-40
25 CAPSULE ORAL
COMMUNITY
Start: 2021-12-01

## 2022-05-24 RX ORDER — METHYLERGONOVINE MALEATE 0.2 MG/ML
INJECTION INTRAVENOUS
Status: DISPENSED
Start: 2022-05-24 | End: 2022-05-25

## 2022-05-24 RX ORDER — TERBUTALINE SULFATE 1 MG/ML
0.25 INJECTION, SOLUTION SUBCUTANEOUS AS NEEDED
Status: DISCONTINUED | OUTPATIENT
Start: 2022-05-24 | End: 2022-05-24 | Stop reason: HOSPADM

## 2022-05-24 RX ORDER — CEFAZOLIN SODIUM/WATER 2 G/20 ML
2 SYRINGE (ML) INTRAVENOUS ONCE
Status: COMPLETED | OUTPATIENT
Start: 2022-05-24 | End: 2022-05-24

## 2022-05-24 RX ORDER — MELATONIN
325
COMMUNITY

## 2022-05-24 RX ORDER — ACETAMINOPHEN 500 MG
500 TABLET ORAL EVERY 6 HOURS PRN
Status: DISCONTINUED | OUTPATIENT
Start: 2022-05-24 | End: 2022-05-24 | Stop reason: HOSPADM

## 2022-05-24 RX ORDER — IBUPROFEN 600 MG/1
600 TABLET ORAL EVERY 6 HOURS PRN
Status: DISCONTINUED | OUTPATIENT
Start: 2022-05-24 | End: 2022-05-24 | Stop reason: HOSPADM

## 2022-05-24 RX ORDER — TRISODIUM CITRATE DIHYDRATE AND CITRIC ACID MONOHYDRATE 500; 334 MG/5ML; MG/5ML
30 SOLUTION ORAL AS NEEDED
Status: DISCONTINUED | OUTPATIENT
Start: 2022-05-24 | End: 2022-05-24 | Stop reason: HOSPADM

## 2022-05-24 RX ORDER — BUPIVACAINE HCL/0.9 % NACL/PF 0.25 %
5 PLASTIC BAG, INJECTION (ML) EPIDURAL AS NEEDED
Status: DISCONTINUED | OUTPATIENT
Start: 2022-05-24 | End: 2022-05-25

## 2022-05-24 RX ORDER — MISOPROSTOL 200 UG/1
TABLET ORAL
Status: DISPENSED
Start: 2022-05-24 | End: 2022-05-25

## 2022-05-24 RX ORDER — AMMONIA INHALANTS 0.04 G/.3ML
0.3 INHALANT RESPIRATORY (INHALATION) AS NEEDED
Status: DISCONTINUED | OUTPATIENT
Start: 2022-05-24 | End: 2022-05-24 | Stop reason: HOSPADM

## 2022-05-24 RX ORDER — URSODIOL 300 MG/1
300 CAPSULE ORAL 2 TIMES DAILY
COMMUNITY
Start: 2022-05-02 | End: 2022-05-25

## 2022-05-24 NOTE — PLAN OF CARE
Problem: BIRTH - VAGINAL/ SECTION  Goal: Fetal and maternal status remain reassuring during the birth process  Description: INTERVENTIONS:  - Monitor vital signs  - Monitor fetal heart rate  - Monitor uterine activity  - Monitor labor progression (vaginal delivery)  - DVT prophylaxis (C/S delivery)  - Surgical antibiotic prophylaxis (C/S delivery)  Outcome: Progressing     Problem: PAIN - ADULT  Goal: Verbalizes/displays adequate comfort level or patient's stated pain goal  Description: INTERVENTIONS:  - Encourage pt to monitor pain and request assistance  - Assess pain using appropriate pain scale  - Administer analgesics based on type and severity of pain and evaluate response  - Implement non-pharmacological measures as appropriate and evaluate response  - Consider cultural and social influences on pain and pain management  - Manage/alleviate anxiety  - Utilize distraction and/or relaxation techniques  - Monitor for opioid side effects  - Notify MD/LIP if interventions unsuccessful or patient reports new pain  - Anticipate increased pain with activity and pre-medicate as appropriate  Outcome: Progressing     Problem: ANXIETY  Goal: Will report anxiety at manageable levels  Description: INTERVENTIONS:  - Administer medication as ordered  - Teach and rehearse alternative coping skills  - Provide emotional support with 1:1 interaction with staff  Outcome: Progressing     Problem: Patient/Family Goals  Goal: Patient/Family Long Term Goal  Description: Patient's Long Term Goal: to have adequate pain management   Interventions:  -   - See additional Care Plan goals for specific interventions  Outcome: Progressing  Goal: Patient/Family Short Term Goal  Description: Patient's Short Term Goal: to have an uncomplicated vaginal delivery     Interventions:   -   - See additional Care Plan goals for specific interventions  Outcome: Progressing

## 2022-05-24 NOTE — H&P
[facility]    PATIENT'S NAME: Wins[facility]   ATTENDING PHYSICIAN: Domi Rainey M.D. PATIENT ACCOUNT#:   [de-identified]    LOCATION:  [address] [place] [address]  MEDICAL RECORD #:   [id number]       YOB: 1995  ADMISSION DATE:       2022    HISTORY AND PHYSICAL EXAMINATION    HISTORY OF PRESENT ILLNESS:  She is a 54-year-old,  2, para 1-0-0-1, who presents to labor and delivery for induction today at 45 and 2/7 weeks. She has a history of cholestasis of pregnancy and is being induced today. She has had good prenatal care since early on. She has known cholestasis and, thus, is coming in for induction for that reason. She states good fetal movement, no vaginal bleeding. At this point, she is having mild contractions on Pitocin but is not significantly uncomfortable. She had all of her prenatal laboratory studies done, and they are as follows:  Her group B strep was done on May 12, 2022, and is negative. She also had her last bile acids done on May 12, and that was 15. Her CBC initially on 2021, hemoglobin of 12.9, hematocrit 39.5, platelets are 255. Her hepatitis B surface antigen was nonreactive. Rubella antibody IgG reactive. Blood type O positive. Antibody screen negative. RPR nonreactive. HIV negative. On urinalysis, she had some large leukocyte esterase and white blood cells. Her vitamin D was 27 on initial lab work. Ferritin of 34. Parvo virus IgG nonimmune. Urine culture was negative. Toxoplasma IgG was negative. Hemoglobin electrophoresis negative. PAST MEDICAL HISTORY:  As mentioned, she has cholestasis with this pregnancy that was diagnosed, and she has been on Ursodiol. She also has anemia in the past and with the pregnancy. She had a history of a perineal hematoma following delivery of her first child in 2020 that needed evacuation and re-repair.       PAST SURGICAL HISTORY:  She had multiple D and C's with hysteroscopy for retained placental tissue and products of conception November 2020 with Dr. Kristina Vazquez. She had August 2020 revision of second-degree laceration repair and evacuation of 10 mL of hematoma by Dr. Shelby Gilbert. 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 She had operative hysteroscopies done in November 2020, December 4, 2020, and April 15, 2021. First procedure was done by Dr. Kristina Vazquez for suspected retained products of conception but sample was lost due to [facility] and persistent bleeding, and then she had the second procedure done with Dr. Ray Urbina under ultrasound guidance. She had a placental site nodule. Third procedure was done at [facility] [place], operated by [facility], proliferative endometrium and superficial myometrium. She had wisdom teeth in 2012 surgery. REPRODUCTIVE HISTORY:  Menarche at age 14. Periods every 28 days for 6 days. In 2020, she delivered at 40 weeks, 8-hour labor, 8 pounds 10 ounce female vaginally, epidural.  As mentioned above, she had retained placental tissue and multiple D and C's afterwards. SOCIAL HISTORY:  She has no drug use. No alcohol. No smoking during pregnancy. PHYSICAL EXAMINATION:    VITAL SIGNS:  She is 5 feet 9 inches tall, weighs 202 pounds. Blood pressure is stable. Her strip fetal status is reassuring. She is jennifer every 2 to 4 minutes, reactive heart rate. ABDOMEN:  Gravid, with an estimated fetal weight of about 8 to 8-1/2 pounds. PELVIC:  She is 3, 50%, and -2. Artificial rupture of membranes was performed, clear fluid. EXTREMITIES:  Minimal edema. No evidence of DVT. ASSESSMENT:    1. Intrauterine pregnancy at 45 and 2/7 weeks. 2.   Cholestasis of pregnancy, being induced today. 3.   History of prior perineal hematoma, status post her first delivery. 4.   History of multiple hysteroscopies with dilatations and curettages due to retained placental tissue following her first delivery.   5.   Personal history of heart symptoms, missing beat, referred to Cardiology in 2022. Workup was negative. 6.   Sciatica. 7.   Anemia in pregnancy. PLAN:  Anticipate . We will check following delivery for any retained placental tissue. We discussed the risk of bleeding and hemorrhage prior due to her multiple intrauterine procedures. The patient verbalized understanding of the risks and desires to proceed with her induction today.     Dictated By Isacc Tolbert M.D.  d: 2022 12:50:52  t: 2022 14:48:15  [facility] [id number]  /

## 2022-05-24 NOTE — PROGRESS NOTES
Pt is a 28year old female admitted to 110/110-A. Patient presents with:  Scheduled Induction     Pt is  38w0d intra-uterine pregnancy. History obtained, consents signed. Oriented to room, staff, and plan of care.

## 2022-05-24 NOTE — ANESTHESIA PROCEDURE NOTES
Labor Analgesia  Performed by: Rafaela Goldberg MD  Authorized by: Rafaela Goldberg MD       General Information and Staff    Start Time:  5/24/2022 1:25 PM  End Time:  5/24/2022 1:43 PM  Anesthesiologist:  Rafaela Goldberg MD  Performed by:   Anesthesiologist  Patient Location:  OB  Site Identification: surface landmarks    Reason for Block: labor epidural    Preanesthetic Checklist: patient identified, IV checked, risks and benefits discussed, monitors and equipment checked, pre-op evaluation, timeout performed, IV bolus, anesthesia consent and sterile technique used      Procedure Details    Patient Position:  Sitting  Prep: ChloraPrep    Monitoring:  Heart rate and continuous pulse ox  Approach:  Midline    Epidural Needle    Injection Technique:  SAPNA air  Needle Type:  Tuohy  Needle Gauge:  17 G  Needle Length:  3.375 in  Location:  L3-4    Spinal Needle      Catheter    Catheter Type:  End hole  Catheter Size:  19 G  Test Dose:  Negative    Assessment      Additional Comments     Test Dose Given at 1333 pm  Marcaine 0.25% 10cc given  100 mcg fentanyl given  Infusion started at 12cc/hr

## 2022-05-25 VITALS
RESPIRATION RATE: 16 BRPM | DIASTOLIC BLOOD PRESSURE: 65 MMHG | BODY MASS INDEX: 25 KG/M2 | HEART RATE: 67 BPM | TEMPERATURE: 98 F | HEIGHT: 70 IN | SYSTOLIC BLOOD PRESSURE: 106 MMHG | OXYGEN SATURATION: 100 %

## 2022-05-25 LAB
BASOPHILS # BLD AUTO: 0.03 X10(3) UL (ref 0–0.2)
BASOPHILS NFR BLD AUTO: 0.3 %
EOSINOPHIL # BLD AUTO: 0.09 X10(3) UL (ref 0–0.7)
EOSINOPHIL NFR BLD AUTO: 1 %
ERYTHROCYTE [DISTWIDTH] IN BLOOD BY AUTOMATED COUNT: 13.1 %
HCT VFR BLD AUTO: 33.4 %
HGB BLD-MCNC: 10.9 G/DL
IMM GRANULOCYTES # BLD AUTO: 0.08 X10(3) UL (ref 0–1)
IMM GRANULOCYTES NFR BLD: 0.9 %
LYMPHOCYTES # BLD AUTO: 1.37 X10(3) UL (ref 1–4)
LYMPHOCYTES NFR BLD AUTO: 15 %
MCH RBC QN AUTO: 30.8 PG (ref 26–34)
MCHC RBC AUTO-ENTMCNC: 32.6 G/DL (ref 31–37)
MCV RBC AUTO: 94.4 FL
MONOCYTES # BLD AUTO: 0.66 X10(3) UL (ref 0.1–1)
MONOCYTES NFR BLD AUTO: 7.2 %
NEUTROPHILS # BLD AUTO: 6.91 X10 (3) UL (ref 1.5–7.7)
NEUTROPHILS # BLD AUTO: 6.91 X10(3) UL (ref 1.5–7.7)
NEUTROPHILS NFR BLD AUTO: 75.6 %
PLATELET # BLD AUTO: 130 10(3)UL (ref 150–450)
RBC # BLD AUTO: 3.54 X10(6)UL
WBC # BLD AUTO: 9.1 X10(3) UL (ref 4–11)

## 2022-05-25 PROCEDURE — 85025 COMPLETE CBC W/AUTO DIFF WBC: CPT | Performed by: OBSTETRICS & GYNECOLOGY

## 2022-05-25 RX ORDER — ACETAMINOPHEN 500 MG
1000 TABLET ORAL EVERY 6 HOURS PRN
Status: DISCONTINUED | OUTPATIENT
Start: 2022-05-24 | End: 2022-05-25

## 2022-05-25 RX ORDER — IBUPROFEN 600 MG/1
600 TABLET ORAL EVERY 6 HOURS
Status: DISCONTINUED | OUTPATIENT
Start: 2022-05-25 | End: 2022-05-25

## 2022-05-25 RX ORDER — SIMETHICONE 80 MG
80 TABLET,CHEWABLE ORAL 3 TIMES DAILY PRN
Status: DISCONTINUED | OUTPATIENT
Start: 2022-05-25 | End: 2022-05-25

## 2022-05-25 RX ORDER — ACETAMINOPHEN 500 MG
500 TABLET ORAL EVERY 6 HOURS PRN
Status: DISCONTINUED | OUTPATIENT
Start: 2022-05-24 | End: 2022-05-25

## 2022-05-25 RX ORDER — DOCUSATE SODIUM 100 MG/1
100 CAPSULE, LIQUID FILLED ORAL
Status: DISCONTINUED | OUTPATIENT
Start: 2022-05-25 | End: 2022-05-25

## 2022-05-25 RX ORDER — BISACODYL 10 MG
10 SUPPOSITORY, RECTAL RECTAL ONCE AS NEEDED
Status: DISCONTINUED | OUTPATIENT
Start: 2022-05-25 | End: 2022-05-25

## 2022-05-25 NOTE — PLAN OF CARE
Problem: SAFETY ADULT - FALL  Goal: Free from fall injury  Description: INTERVENTIONS:  - Assess pt frequently for physical needs  - Identify cognitive and physical deficits and behaviors that affect risk of falls. - [place] fall precautions as indicated by assessment.  - Educate pt/family on patient safety including physical limitations  - Instruct pt to call for assistance with activity based on assessment  - Modify environment to reduce risk of injury  - Provide assistive devices as appropriate  - Consider OT/PT consult to assist with strengthening/mobility  - Encourage toileting schedule  Outcome: Progressing     Problem: POSTPARTUM  Goal: Long Term Goal:Experiences normal postpartum course  Description: INTERVENTIONS:  - Assess and monitor vital signs and lab values. - Assess fundus and lochia. - Provide ice/sitz baths for perineum discomfort. - Monitor healing of incision/episiotomy/laceration, and assess for signs and symptoms of infection and hematoma. - Assess bladder function and monitor for bladder distention.  - Provide/instruct/assist with pericare as needed. - Provide VTE prophylaxis as needed. - Monitor bowel function.  - Encourage ambulation and provide assistance as needed. - Assess and monitor emotional status and provide social service/psych resources as needed. - Utilize standard precautions and use personal protective equipment as indicated. Ensure aseptic care of all intravenous lines and invasive tubes/drains.  - Obtain immunization and exposure to communicable diseases history. Outcome: Progressing  Goal: Optimize infant feeding at the breast  Description: INTERVENTIONS:  - Initiate breast feeding within first hour after birth. - Monitor effectiveness of current breast feeding efforts. - Assess support systems available to mother/family.  - Identify cultural beliefs/practices regarding lactation, letdown techniques, maternal food preferences.   - Assess mother's knowledge and previous experience with breast feeding.  - Provide information as needed about early infant feeding cues (e.g., rooting, lip smacking, sucking fingers/hand) versus late cue of crying.  - Discuss/demonstrate breast feeding aids (e.g., infant sling, nursing footstool/pillows, and breast pumps). - Encourage mother/other family members to express feelings/concerns, and actively listen. - Educate father/SO about benefits of breast feeding and how to manage common lactation challenges. - Recommend avoidance of specific medications or substances incompatible with breast feeding.  - Assess and monitor for signs of nipple pain/trauma. - Instruct and provide assistance with proper latch. - Review techniques for milk expression (breast pumping) and storage of breast milk. Provide pumping equipment/supplies, instructions and assistance, as needed. - Encourage rooming-in and breast feeding on demand.  - Encourage skin-to-skin contact. - Provide LC support as needed. - Assess for and manage engorgement. - Provide breast feeding education handouts and information on community breast feeding support. Outcome: Progressing  Goal: Establishment of adequate milk supply with medication/procedure interruptions  Description: INTERVENTIONS:  - Review techniques for milk expression (breast pumping). - Provide pumping equipment/supplies, instructions, and assistance until it is safe to breastfeed infant. Outcome: Progressing  Goal: Experiences normal breast weaning course  Description: INTERVENTIONS:  - Assess for and manage engorgement. - Instruct on breast care. - Provide comfort measures. Outcome: Progressing  Goal: Appropriate maternal -  bonding  Description: INTERVENTIONS:  - Assess caregiver- interactions. - Assess caregiver's emotional status and coping mechanisms. - Encourage caregiver to participate in  daily care.   - Assess support systems available to mother/family.  - Provide social services/case management support as needed.   Outcome: Progressing

## 2022-05-25 NOTE — DISCHARGE SUMMARY
[facility]  Discharge Summary    Elton Gillette Patient Status:  Inpatient    3/16/1995 MRN [id number]   [facility] 2SW-J Attending Omayra Chaney MD, MD   [facility] Day # 1 PCP Unknown Pcp     Date of Admission: 2022    Date of Discharge: 2022    Admitting Diagnosis: Pregnancy  Pregnancy    Discharge Diagnosis: Patient Active Problem List:     Rh negative status during pregnancy     Pregnancy     Postpartum care and examination of lactating mother     Postpartum nipple pain      Reason for Admission: Pregnancy ***    Procedures:  ***    Hospital Course:Gloria Alberto Solitario *** came in for ***. She underwent  with out problems. Please refer to delivery note for more details. She did well postpartum. Pain and bleeding were control. She was ambulating and tolerating diet. She was afebrile for the entire admission. She was discharged home in stable condition on PPD#2    Labs:   Lab Results   Component Value Date    WBC 9.1 2022    HGB 10.9 2022    HCT 33.4 2022    .0        Complications: none    Disposition: Home with instructions    DIET: General    Activity: slightly restricted    Discharge Condition: Stable    Follow Up: 6 weeks for PP visit, pt to call for appointment    Discharge Medications: Current Discharge Medication List    CONTINUE these medications which have NOT CHANGED    ferrous sulfate 325 (65 FE) MG Oral Tab EC  Take 325 mg by mouth daily with breakfast.    Cholecalciferol (D3-1000) 25 MCG (1000 UT) Oral Tab  25 tablets. acetaminophen 500 MG Oral Tab  Take 1,000 mg by mouth every 6 (six) hours as needed for Pain.      prenatal multivitamin plus DHA 27-0.8-228 MG Oral Cap  Take 1 capsule by mouth daily. Prenatal Vit-Fe Fum-FA-Omega (PNV PRENATAL PLUS MULTIVIT+DHA) 27-1 & 312 MG Oral Misc  Take 1 tablet by mouth daily.   Qty: 90 each Refills: 0    ibuprofen 600 MG Oral Tab  Take 600 mg by mouth every 6 (six) hours as needed for Pain. Misc. Devices (BREAST PUMP) Does not apply Misc  One double electric breast pump and pump supplies.  SIGRID 08/21/2020  Qty: 1 each Refills: 0  Associated Diagnoses:Breast feeding status of mother      STOP taking these medications    ursodiol 300 MG Oral Cap    Drospirenone-Ethinyl Estradiol 3-0.02 MG Oral Tab            Corry Howell MD  5/25/2022  11:29 AM

## 2022-05-25 NOTE — PROGRESS NOTES
Patient admitted to mother baby, ID bands and security sensors verified, oriented to unit and plan of care.

## 2022-05-25 NOTE — L&D DELIVERY NOTE
[facility]    PATIENT'S NAME: Roman Strickland   ATTENDING PHYSICIAN: Michele Councilman, M.D. PATIENT ACCOUNT #: [de-identified] LOCATION:  [address] [place] [address]   MEDICAL RECORD #: [id number] YOB: 1995   ADMISSION DATE: 2022 DELIVERY DATE: 2022     DELIVERY NOTE    She is a 39-year-old female,  2, para 1-0-0-1, who presents for induction at 38-2/7 weeks due to history of cholestasis of pregnancy. Her cervix on admission, she is 2, 60%, and -2. Of note is that the patient has had good prenatal care with our office. She previously had a vaginal delivery with a perineal infection of her repair after delivery approximately 5 days later and a revision of the repair. She also had retained placental tissue and necessitated multiple D and C, hysteroscopies previously for removal of that placental tissue. Today, artificial rupture of membranes was performed at approximately 1230. Clear fluid. She progressed from there and received an epidural.  She became complete and felt pressure. Head was noted to be at the +2 station, and she pushed for approximately 10 to 15 minutes and brought the baby's head down in a controlled fashion. Head was delivered. Mouth and nose were suctioned, and there was a nuchal cord x1 that was noted that was clamped and cut at the perineum and then the shoulders were easily delivered. The baby was vigorous. This was a boy with Apgars of 8 and 9, weighing 7 pounds 1 ounce. The baby was placed on the maternal abdomen. Following that, placenta was delivered with manual assistance, and the uterus was explored in order to ensure that there were no residual fragments given her history of prior retained placental tissue. There was no residual tissue that was felt, and the placenta was examined and noted to be complete. Her cervix was intact. She sustained a vaginal laceration in the midline in the area of her previous repair.   She also sustained a right labial laceration that was repaired with 3-0 chromic suture in a standard fashion. The repair of the vaginal laceration was performed with 2-0 chromic on a CT-1. Deep sutures were placed in order to ensure good closure of this tissue, with no additional bleeding, and then the rest of the repair was performed in a standard manner for second-degree repair. Her uterus did contract down well. Quantitative blood loss to be added. All sponges and needle counts were correct at the end of the delivery. Baby and mom are doing well. Of note is that the area of her repair was examined several times to ensure that there was no expansion of any type and there was no evidence of a hematoma. Her uterine bleeding appeared normal as well. Following delivery, mom and baby are doing well. Plan is going to be routine postpartum care. She will receive p.o. pain medications and stool softener. I will prescribe 1 dose of IV Ancef due to uterine manipulation and due to history of prior infection of her perineal repair, that will be a prophylactic dose that will be given.     [id number]  Dictated By Martínez Chan M.D.  d: 05/24/2022 19:30:07  t: 05/25/2022 84:37:93  Psychiatric [id number]  LL/

## 2022-05-25 NOTE — PLAN OF CARE
Problem: SAFETY ADULT - FALL  Goal: Free from fall injury  Description: INTERVENTIONS:  - Assess pt frequently for physical needs  - Identify cognitive and physical deficits and behaviors that affect risk of falls. - [place] fall precautions as indicated by assessment.  - Educate pt/family on patient safety including physical limitations  - Instruct pt to call for assistance with activity based on assessment  - Modify environment to reduce risk of injury  - Provide assistive devices as appropriate  - Consider OT/PT consult to assist with strengthening/mobility  - Encourage toileting schedule  Outcome: Progressing     Problem: POSTPARTUM  Goal: Long Term Goal:Experiences normal postpartum course  Description: INTERVENTIONS:  - Assess and monitor vital signs and lab values. - Assess fundus and lochia. - Provide ice/sitz baths for perineum discomfort. - Monitor healing of incision/episiotomy/laceration, and assess for signs and symptoms of infection and hematoma. - Assess bladder function and monitor for bladder distention.  - Provide/instruct/assist with pericare as needed. - Provide VTE prophylaxis as needed. - Monitor bowel function.  - Encourage ambulation and provide assistance as needed. - Assess and monitor emotional status and provide social service/psych resources as needed. - Utilize standard precautions and use personal protective equipment as indicated. Ensure aseptic care of all intravenous lines and invasive tubes/drains.  - Obtain immunization and exposure to communicable diseases history. Outcome: Progressing  Goal: Optimize infant feeding at the breast  Description: INTERVENTIONS:  - Initiate breast feeding within first hour after birth. - Monitor effectiveness of current breast feeding efforts. - Assess support systems available to mother/family.  - Identify cultural beliefs/practices regarding lactation, letdown techniques, maternal food preferences.   - Assess mother's knowledge and previous experience with breast feeding.  - Provide information as needed about early infant feeding cues (e.g., rooting, lip smacking, sucking fingers/hand) versus late cue of crying.  - Discuss/demonstrate breast feeding aids (e.g., infant sling, nursing footstool/pillows, and breast pumps). - Encourage mother/other family members to express feelings/concerns, and actively listen. - Educate father/SO about benefits of breast feeding and how to manage common lactation challenges. - Recommend avoidance of specific medications or substances incompatible with breast feeding.  - Assess and monitor for signs of nipple pain/trauma. - Instruct and provide assistance with proper latch. - Review techniques for milk expression (breast pumping) and storage of breast milk. Provide pumping equipment/supplies, instructions and assistance, as needed. - Encourage rooming-in and breast feeding on demand.  - Encourage skin-to-skin contact. - Provide LC support as needed. - Assess for and manage engorgement. - Provide breast feeding education handouts and information on community breast feeding support. Outcome: Progressing  Goal: Establishment of adequate milk supply with medication/procedure interruptions  Description: INTERVENTIONS:  - Review techniques for milk expression (breast pumping). - Provide pumping equipment/supplies, instructions, and assistance until it is safe to breastfeed infant. Outcome: Progressing  Goal: Appropriate maternal -  bonding  Description: INTERVENTIONS:  - Assess caregiver- interactions. - Assess caregiver's emotional status and coping mechanisms. - Encourage caregiver to participate in  daily care. - Assess support systems available to mother/family.  - Provide /case management support as needed.   Outcome: Progressing     Problem: POSTPARTUM  Goal: Experiences normal breast weaning course  Description: INTERVENTIONS:  - Assess for and manage engorgement. - Instruct on breast care. - Provide comfort measures.   Outcome: Completed

## 2022-05-25 NOTE — L&D DELIVERY NOTE
Kinga Ferguson [[id number]]    Labor Events     labor?: No   steroids?: None  Antibiotics received during labor?: No  Antibiotics (enter # doses in comment): none  Rupture date/time: 2022 123     Rupture type: AROM  Fluid color: Clear  Induction: Oxytocin  Indications for induction: Other - comment  Induction comment: Cholestasis  Intrapartum & labor complications: Variable decelerations     Labor Event Times    Labor onset date/time: 2022  Dilation complete date/time: 2022  Start pushing date/time: 2022 183      Presentation    Presentation: Vertex  Position: Occiput Anterior     Operative Delivery    Operative Vaginal Delivery: N/A            Shoulder Dystocia    Shoulder Dystocia: N/A     Anesthesia    Method: Epidural          [place] Delivery    Head delivery date/time: 2022 18:44:53   Delivery date/time:  22 18:45:19   Delivery type: Normal spontaneous vaginal delivery    Details:     Delivery location: delivery room     Delivery Providers    Delivering Clinician: Sasmon Key MD, MD   Delivery personnel:  Provider Role   Lisa Mukherjee, RN Baby Nurse   Zhang Bowling, PAULINA Delivery Nurse         Cord    Vessels: 3 Vessels  Complications: Nuchal  # of loops: 1  Timed cord clamping: No  Cord blood disposition: to lab  Gases sent?: No     Resuscitation    Method: None     [place] Measurements    Weight: 3200 g 7 lb 0.9 oz Length: 50.8 cm   Head circum. : 36 cm Chest circum.: 32 cm      Abdominal circum.: 29.5 cm       Placenta    Date/time: 2022 1849  Removal: Spontaneous  Appearance: Intact  Disposition: held for future pathology     Apgars    Living status: Living   Apgar Scoring Key:    0 1 2    Skin color Blue or pale Acrocyanotic Completely pink    Heart rate Absent <100 bpm >100 bpm    Reflex irritability No response Grimace Cry or active withdrawal    Muscle tone Limp Some flexion Active motion    Respiratory effort Absent Weak cry; hypoventilation Good, crying              1 Minute:  5 Minute:  10 Minute:  15 Minute:  20 Minute:    Skin color: 0        Heart rate: 2        Reflex irritablity: 2        Muscle tone: 2        Respiratory effort: 2        Total: 8           Apgars assigned by: Celia Bonilla   disposition: with mother     Skin to Skin    Skin to skin initiated date/time: 2022 1859  Skin to skin with:  Mother     Vaginal Count    Initial count RN: Nikita Medley RN  Initial count Tech: Vale Samayoa   [place]ar[place]    Initial counts 11   0    Final counts        Final count RN: Nikita Medley RN  Final count MD: Jazmin Ortiz MD, MD     Delivery (Maternal)    Episiotomy: None  Perineal lacerations: 2nd Repaired?: Yes   Periurethral laceration: right Repaired?: Yes   Labial laceration: right Repaired?: Yes   Vaginal laceration?: Yes Repaired?: Yes   Cervical laceration?: No    Clitoral laceration?: No

## 2022-05-25 NOTE — PROGRESS NOTES
Patient assisted up to bathroom, steady gait, voided freely without difficulty. Lexii-care taught and completed. Gown changed, pads/panties given to patient. Report called to [facility]. Patient transferred to mother/baby via wheelchair with  in arms, stable X 2, accompanied by significant other and all belongings.

## 2022-05-25 NOTE — PLAN OF CARE
Problem: SAFETY ADULT - FALL  Goal: Free from fall injury  Description: INTERVENTIONS:  - Assess pt frequently for physical needs  - Identify cognitive and physical deficits and behaviors that affect risk of falls.   - [place] fall precautions as indicated by assessment.  - Educate pt/family on patient safety including physical limitations  - Instruct pt to call for assistance with activity based on assessment  - Modify environment to reduce risk of injury  - Provide assistive devices as appropriate  - Consider OT/PT consult to assist with strengthening/mobility  - Encourage toileting schedule  5/24/2022 2027 by Jose Cardenas RN  Outcome: Progressing  5/24/2022 2027 by Jose Cardenas RN  Reactivated

## 2022-05-25 NOTE — PLAN OF CARE
Problem: SAFETY ADULT - FALL  Goal: Free from fall injury  Description: INTERVENTIONS:  - Assess pt frequently for physical needs  - Identify cognitive and physical deficits and behaviors that affect risk of falls.   - [place] fall precautions as indicated by assessment.  - Educate pt/family on patient safety including physical limitations  - Instruct pt to call for assistance with activity based on assessment  - Modify environment to reduce risk of injury  - Provide assistive devices as appropriate  - Consider OT/PT consult to assist with strengthening/mobility  - Encourage toileting schedule  Outcome: Progressing

## 2022-05-26 NOTE — PROGRESS NOTES
All discharge instructions reviewed with Pt and , both verbalize understanding of all instructions. ID bands matched x3 with  and infant. All questions answered. HUGS/KISSES removed. Teal band given.

## 2022-05-26 NOTE — PLAN OF CARE
Problem: SAFETY ADULT - FALL  Goal: Free from fall injury  Description: INTERVENTIONS:  - Assess pt frequently for physical needs  - Identify cognitive and physical deficits and behaviors that affect risk of falls. - [place] fall precautions as indicated by assessment.  - Educate pt/family on patient safety including physical limitations  - Instruct pt to call for assistance with activity based on assessment  - Modify environment to reduce risk of injury  - Provide assistive devices as appropriate  - Consider OT/PT consult to assist with strengthening/mobility  - Encourage toileting schedule  5/25/2022 1949 by Cecille Gale RN  Outcome: Completed  5/25/2022 0904 by Cecille Gale RN  Outcome: Progressing     Problem: POSTPARTUM  Goal: Long Term Goal:Experiences normal postpartum course  Description: INTERVENTIONS:  - Assess and monitor vital signs and lab values. - Assess fundus and lochia. - Provide ice/sitz baths for perineum discomfort. - Monitor healing of incision/episiotomy/laceration, and assess for signs and symptoms of infection and hematoma. - Assess bladder function and monitor for bladder distention.  - Provide/instruct/assist with pericare as needed. - Provide VTE prophylaxis as needed. - Monitor bowel function.  - Encourage ambulation and provide assistance as needed. - Assess and monitor emotional status and provide social service/psych resources as needed. - Utilize standard precautions and use personal protective equipment as indicated. Ensure aseptic care of all intravenous lines and invasive tubes/drains.  - Obtain immunization and exposure to communicable diseases history. 5/25/2022 1949 by Cecille Gale RN  Outcome: Completed  5/25/2022 0904 by Cecille Gale RN  Outcome: Progressing  Goal: Optimize infant feeding at the breast  Description: INTERVENTIONS:  - Initiate breast feeding within first hour after birth. - Monitor effectiveness of current breast feeding efforts.   - Assess support systems available to mother/family.  - Identify cultural beliefs/practices regarding lactation, letdown techniques, maternal food preferences. - Assess mother's knowledge and previous experience with breast feeding.  - Provide information as needed about early infant feeding cues (e.g., rooting, lip smacking, sucking fingers/hand) versus late cue of crying.  - Discuss/demonstrate breast feeding aids (e.g., infant sling, nursing footstool/pillows, and breast pumps). - Encourage mother/other family members to express feelings/concerns, and actively listen. - Educate father/SO about benefits of breast feeding and how to manage common lactation challenges. - Recommend avoidance of specific medications or substances incompatible with breast feeding.  - Assess and monitor for signs of nipple pain/trauma. - Instruct and provide assistance with proper latch. - Review techniques for milk expression (breast pumping) and storage of breast milk. Provide pumping equipment/supplies, instructions and assistance, as needed. - Encourage rooming-in and breast feeding on demand.  - Encourage skin-to-skin contact. - Provide LC support as needed. - Assess for and manage engorgement. - Provide breast feeding education handouts and information on community breast feeding support. 5/25/2022 1949 by Magdy Dunn RN  Outcome: Completed  5/25/2022 0904 by Magdy Dunn RN  Outcome: Progressing  Goal: Establishment of adequate milk supply with medication/procedure interruptions  Description: INTERVENTIONS:  - Review techniques for milk expression (breast pumping). - Provide pumping equipment/supplies, instructions, and assistance until it is safe to breastfeed infant. 5/25/2022 1949 by Magdy Dunn RN  Outcome: Completed  5/25/2022 0904 by Magdy Dunn RN  Outcome: Progressing  Goal: Experiences normal breast weaning course  Description: INTERVENTIONS:  - Assess for and manage engorgement. - Instruct on breast care.   - Provide comfort measures. Outcome: Completed  Goal: Appropriate maternal -  bonding  Description: INTERVENTIONS:  - Assess caregiver- interactions. - Assess caregiver's emotional status and coping mechanisms. - Encourage caregiver to participate in  daily care. - Assess support systems available to mother/family.  - Provide /case management support as needed.   2022 by Muriel Carl RN  Outcome: Completed  2022 by Muriel Carl RN  Outcome: Progressing

## 2022-05-31 ENCOUNTER — TELEPHONE (OUTPATIENT)
Dept: OBGYN UNIT | Facility: HOSPITAL | Age: 27
End: 2022-05-31

## 2022-07-05 ENCOUNTER — ORDER TRANSCRIPTION (OUTPATIENT)
Dept: ADMINISTRATIVE | Facility: HOSPITAL | Age: 27
End: 2022-07-05

## 2022-07-05 DIAGNOSIS — Z13.6 SCREENING FOR CARDIOVASCULAR CONDITION: Primary | ICD-10-CM

## 2022-09-15 ENCOUNTER — HOSPITAL ENCOUNTER (OUTPATIENT)
Dept: CT IMAGING | Age: 27
Discharge: HOME | End: 2022-09-15
Attending: INTERNAL MEDICINE

## 2022-09-15 DIAGNOSIS — Z13.6 SCREENING FOR CARDIOVASCULAR CONDITION: ICD-10-CM

## 2022-09-15 LAB
GLUCOSE POC: 105 MG/DL (ref 70–100)
HDL POC: 66 MG/DL (ref 40–60)
LDL POC: 62 MG/DL (ref 0–130)
TOTAL CHOLESTEROL POC: 147 MG/DL (ref 0–200)
TRIGLYCERIDES POC: 97 MG/DL (ref 0–200)

## 2022-09-15 NOTE — PROGRESS NOTES
Date of Service 9/15/2022    Brionna Gore  Date of Birth 3/16/1995    Patient Age: 28year old    PCP: Unknown Pcp/  Cardiologist: Dr. Allyn Vela  No address on file    Consult Type  Type Scan/Screening: Heart Scan  Preliminary Heart Scan Score: 0                Body Mass Index  There is no height or weight on file to calculate BMI. Lipid Profile  Cholesterol: 147, done on 9/15/2022. HDL Cholesterol: 66, done on 9/15/2022. LDL Cholesterol: 62, done on 9/15/2022. TriGlycerides 97, done on 9/15/2022. Nurse Review  Risk factor information and results reviewed with Nurse: Yes (Family History)    Recommended Follow Up:  Consult your physician regarding[de-identified] Final Heart Scan Report; Discuss potential for Incidental Finding;Glucose (Non-Fasting); Cholesterol Results (Non-Fasting)    No data recorded      Recommendations for Change:  Nutrition Changes: No Change Needed  Cholesterol Modification (goal of therapy depends upon your risk): No Change Needed     Smoking Cessation: No Change Needed        Repeat Heart Scan: 5 years if Calcium Score is 0.0     Other[de-identified] Nonfasting glucose: 105. Blood pressure 100/60    Stephen Recommended Resources:           Ilda Youssef RN        Please Contact the Nurse Heart Line with any Questions or Concerns [phone].

## 2023-03-30 ENCOUNTER — OFFICE VISIT (OUTPATIENT)
Dept: FAMILY MEDICINE CLINIC | Facility: CLINIC | Age: 28
End: 2023-03-30
Payer: COMMERCIAL

## 2023-03-30 VITALS
TEMPERATURE: 98 F | HEART RATE: 82 BPM | OXYGEN SATURATION: 98 % | WEIGHT: 175 LBS | SYSTOLIC BLOOD PRESSURE: 110 MMHG | DIASTOLIC BLOOD PRESSURE: 60 MMHG | BODY MASS INDEX: 25.63 KG/M2 | HEIGHT: 69.29 IN

## 2023-03-30 DIAGNOSIS — D64.9 ANEMIA, UNSPECIFIED TYPE: ICD-10-CM

## 2023-03-30 DIAGNOSIS — Z00.00 ROUTINE GENERAL MEDICAL EXAMINATION AT HEALTH CARE FACILITY: Primary | ICD-10-CM

## 2023-03-30 PROBLEM — Z34.90 PREGNANCY: Status: RESOLVED | Noted: 2020-08-21 | Resolved: 2023-03-30

## 2023-03-30 PROBLEM — Z34.90 PREGNANCY (HCC): Status: RESOLVED | Noted: 2020-08-21 | Resolved: 2023-03-30

## 2023-03-30 PROCEDURE — 3008F BODY MASS INDEX DOCD: CPT | Performed by: FAMILY MEDICINE

## 2023-03-30 PROCEDURE — 3078F DIAST BP <80 MM HG: CPT | Performed by: FAMILY MEDICINE

## 2023-03-30 PROCEDURE — 3074F SYST BP LT 130 MM HG: CPT | Performed by: FAMILY MEDICINE

## 2023-03-30 PROCEDURE — 99385 PREV VISIT NEW AGE 18-39: CPT | Performed by: FAMILY MEDICINE

## 2023-03-30 RX ORDER — MULTIVITAMIN/IRON/FOLIC ACID 18MG-0.4MG
2 TABLET ORAL DAILY
COMMUNITY

## 2023-03-30 RX ORDER — CHOLECALCIFEROL (VITAMIN D3) 1250 MCG
1 CAPSULE ORAL DAILY
COMMUNITY

## 2023-04-03 ENCOUNTER — LAB ENCOUNTER (OUTPATIENT)
Dept: LAB | Age: 28
End: 2023-04-03
Attending: FAMILY MEDICINE
Payer: COMMERCIAL

## 2023-04-03 DIAGNOSIS — Z00.00 ROUTINE GENERAL MEDICAL EXAMINATION AT HEALTH CARE FACILITY: ICD-10-CM

## 2023-04-03 DIAGNOSIS — D64.9 ANEMIA, UNSPECIFIED TYPE: ICD-10-CM

## 2023-04-03 LAB
ALBUMIN SERPL-MCNC: 3.8 G/DL (ref 3.4–5)
ALBUMIN/GLOB SERPL: 1.4 {RATIO} (ref 1–2)
ALP LIVER SERPL-CCNC: 52 U/L
ALT SERPL-CCNC: 18 U/L
ANION GAP SERPL CALC-SCNC: 3 MMOL/L (ref 0–18)
AST SERPL-CCNC: 16 U/L (ref 15–37)
BASOPHILS # BLD AUTO: 0.05 X10(3) UL (ref 0–0.2)
BASOPHILS NFR BLD AUTO: 1.1 %
BILIRUB SERPL-MCNC: 0.5 MG/DL (ref 0.1–2)
BUN BLD-MCNC: 16 MG/DL (ref 7–18)
CALCIUM BLD-MCNC: 8.7 MG/DL (ref 8.5–10.1)
CHLORIDE SERPL-SCNC: 110 MMOL/L (ref 98–112)
CHOLEST SERPL-MCNC: 138 MG/DL (ref ?–200)
CO2 SERPL-SCNC: 27 MMOL/L (ref 21–32)
CREAT BLD-MCNC: 0.78 MG/DL
DEPRECATED HBV CORE AB SER IA-ACNC: 22.6 NG/ML
EOSINOPHIL # BLD AUTO: 0.12 X10(3) UL (ref 0–0.7)
EOSINOPHIL NFR BLD AUTO: 2.8 %
ERYTHROCYTE [DISTWIDTH] IN BLOOD BY AUTOMATED COUNT: 13.6 %
FASTING PATIENT LIPID ANSWER: YES
FASTING STATUS PATIENT QL REPORTED: YES
GFR SERPLBLD BASED ON 1.73 SQ M-ARVRAT: 105 ML/MIN/1.73M2 (ref 60–?)
GLOBULIN PLAS-MCNC: 2.8 G/DL (ref 2.8–4.4)
GLUCOSE BLD-MCNC: 88 MG/DL (ref 70–99)
HCT VFR BLD AUTO: 38.4 %
HDLC SERPL-MCNC: 62 MG/DL (ref 40–59)
HGB BLD-MCNC: 12.3 G/DL
IMM GRANULOCYTES # BLD AUTO: 0.01 X10(3) UL (ref 0–1)
IMM GRANULOCYTES NFR BLD: 0.2 %
IRON SATN MFR SERPL: 34 %
IRON SERPL-MCNC: 107 UG/DL
LDLC SERPL CALC-MCNC: 65 MG/DL (ref ?–100)
LYMPHOCYTES # BLD AUTO: 1.65 X10(3) UL (ref 1–4)
LYMPHOCYTES NFR BLD AUTO: 37.8 %
MCH RBC QN AUTO: 29.9 PG (ref 26–34)
MCHC RBC AUTO-ENTMCNC: 32 G/DL (ref 31–37)
MCV RBC AUTO: 93.4 FL
MONOCYTES # BLD AUTO: 0.39 X10(3) UL (ref 0.1–1)
MONOCYTES NFR BLD AUTO: 8.9 %
NEUTROPHILS # BLD AUTO: 2.14 X10 (3) UL (ref 1.5–7.7)
NEUTROPHILS # BLD AUTO: 2.14 X10(3) UL (ref 1.5–7.7)
NEUTROPHILS NFR BLD AUTO: 49.2 %
NONHDLC SERPL-MCNC: 76 MG/DL (ref ?–130)
OSMOLALITY SERPL CALC.SUM OF ELEC: 291 MOSM/KG (ref 275–295)
PLATELET # BLD AUTO: 198 10(3)UL (ref 150–450)
POTASSIUM SERPL-SCNC: 3.6 MMOL/L (ref 3.5–5.1)
PROT SERPL-MCNC: 6.6 G/DL (ref 6.4–8.2)
RBC # BLD AUTO: 4.11 X10(6)UL
SODIUM SERPL-SCNC: 140 MMOL/L (ref 136–145)
T4 FREE SERPL-MCNC: 0.8 NG/DL (ref 0.8–1.7)
TIBC SERPL-MCNC: 316 UG/DL (ref 240–450)
TRANSFERRIN SERPL-MCNC: 212 MG/DL (ref 200–360)
TRIGL SERPL-MCNC: 46 MG/DL (ref 30–149)
TSI SER-ACNC: 1.3 MIU/ML (ref 0.36–3.74)
VLDLC SERPL CALC-MCNC: 7 MG/DL (ref 0–30)
WBC # BLD AUTO: 4.4 X10(3) UL (ref 4–11)

## 2023-04-03 PROCEDURE — 83550 IRON BINDING TEST: CPT | Performed by: FAMILY MEDICINE

## 2023-04-03 PROCEDURE — 83540 ASSAY OF IRON: CPT | Performed by: FAMILY MEDICINE

## 2023-04-03 PROCEDURE — 80050 GENERAL HEALTH PANEL: CPT | Performed by: FAMILY MEDICINE

## 2023-04-03 PROCEDURE — 82728 ASSAY OF FERRITIN: CPT | Performed by: FAMILY MEDICINE

## 2023-04-03 PROCEDURE — 84439 ASSAY OF FREE THYROXINE: CPT | Performed by: FAMILY MEDICINE

## 2023-04-03 PROCEDURE — 80061 LIPID PANEL: CPT | Performed by: FAMILY MEDICINE

## 2023-04-04 ENCOUNTER — PATIENT MESSAGE (OUTPATIENT)
Dept: FAMILY MEDICINE CLINIC | Facility: CLINIC | Age: 28
End: 2023-04-04

## 2023-04-06 NOTE — TELEPHONE ENCOUNTER
From: Tien Bermudez  To: Roe Meier DO  Sent: 4/4/2023 1:08 PM CDT  Subject: Test Results Question    I have a question about TSH+FREE T4 resulted on 4/3/23, 5:18 PM.    Thanks Dr Derrick Ponce. I had a suspicion my thyroid numbers may have been low. I see I'm in the very low side of normal. I'm dealing with the inability to drop weight, fatigue, muscle pain, heavy periods and some mood swings. Nothing severe enough to worry about but I assumed these were all symptoms of being postpartum. Now I'm wondering if it's partly due to my thyroid. Is this worth discussing further? My sister deals with this as well.

## 2023-04-06 NOTE — TELEPHONE ENCOUNTER
From: Rita Carnes  To: Rajan Shea DO  Sent: 4/4/2023 1:08 PM CDT  Subject: Test Results Question    I have a question about CBC W/ DIFFERENTIAL resulted on 4/3/23, 5:01 PM.    Would you recommend that I stay on the high dose of vitamin D3 and also the iron supplement?

## 2023-05-11 LAB
ANTIBODY SCREEN OB: NEGATIVE
HEPATITIS B SURFACE ANTIGEN OB: NEGATIVE
HIV RESULT OB: NEGATIVE
RAPID PLASMA REAGIN OB: NONREACTIVE
RH FACTOR OB: POSITIVE

## 2023-06-26 ENCOUNTER — ULTRASOUND ENCOUNTER (OUTPATIENT)
Dept: PERINATAL CARE | Facility: HOSPITAL | Age: 28
End: 2023-06-26
Attending: OBSTETRICS & GYNECOLOGY
Payer: COMMERCIAL

## 2023-06-26 VITALS
BODY MASS INDEX: 25.05 KG/M2 | DIASTOLIC BLOOD PRESSURE: 68 MMHG | SYSTOLIC BLOOD PRESSURE: 112 MMHG | HEART RATE: 87 BPM | WEIGHT: 175 LBS | HEIGHT: 70 IN

## 2023-06-26 DIAGNOSIS — O28.5 ABNORMAL GENETIC TEST DURING PREGNANCY: ICD-10-CM

## 2023-06-26 DIAGNOSIS — O28.5 ABNORMAL GENETIC TEST DURING PREGNANCY: Primary | ICD-10-CM

## 2023-06-26 PROCEDURE — 76801 OB US < 14 WKS SINGLE FETUS: CPT | Performed by: OBSTETRICS & GYNECOLOGY

## 2023-06-26 RX ORDER — METOCLOPRAMIDE 10 MG/1
10 TABLET ORAL
COMMUNITY

## 2023-06-26 RX ORDER — ONDANSETRON 4 MG/1
4 TABLET, ORALLY DISINTEGRATING ORAL EVERY 8 HOURS PRN
COMMUNITY

## 2023-06-26 NOTE — PROGRESS NOTES
Pt here for 1st trimester/doctor consult  No FM yet  Pt c/o occas lower abd cramping, denies vag bleeding and leaking fluid.

## 2023-07-31 ENCOUNTER — OFFICE VISIT (OUTPATIENT)
Dept: PERINATAL CARE | Facility: HOSPITAL | Age: 28
End: 2023-07-31
Attending: OBSTETRICS & GYNECOLOGY
Payer: COMMERCIAL

## 2023-07-31 VITALS
BODY MASS INDEX: 26 KG/M2 | DIASTOLIC BLOOD PRESSURE: 57 MMHG | WEIGHT: 182 LBS | SYSTOLIC BLOOD PRESSURE: 101 MMHG | HEART RATE: 73 BPM

## 2023-07-31 DIAGNOSIS — O28.5 ABNORMAL GENETIC TEST DURING PREGNANCY: ICD-10-CM

## 2023-07-31 DIAGNOSIS — O28.5 ABNORMAL GENETIC TEST DURING PREGNANCY: Primary | ICD-10-CM

## 2023-07-31 PROCEDURE — 76815 OB US LIMITED FETUS(S): CPT | Performed by: OBSTETRICS & GYNECOLOGY

## 2023-07-31 PROCEDURE — 76946 ECHO GUIDE FOR AMNIOCENTESIS: CPT

## 2023-07-31 PROCEDURE — 59015 CHORION BIOPSY: CPT

## 2023-07-31 NOTE — PROGRESS NOTES
Pt here for Level II Ultrasound  +flutters noted   Pt denies complaints, pt noted possible cholestasis, OB following.

## 2023-08-04 ENCOUNTER — TELEPHONE (OUTPATIENT)
Dept: PERINATAL CARE | Facility: HOSPITAL | Age: 28
End: 2023-08-04

## 2023-08-21 ENCOUNTER — ULTRASOUND ENCOUNTER (OUTPATIENT)
Dept: PERINATAL CARE | Facility: HOSPITAL | Age: 28
End: 2023-08-21
Attending: OBSTETRICS & GYNECOLOGY
Payer: COMMERCIAL

## 2023-08-21 VITALS
DIASTOLIC BLOOD PRESSURE: 58 MMHG | WEIGHT: 186 LBS | HEIGHT: 70 IN | BODY MASS INDEX: 26.63 KG/M2 | HEART RATE: 77 BPM | SYSTOLIC BLOOD PRESSURE: 101 MMHG

## 2023-08-21 DIAGNOSIS — O28.5 ABNORMAL GENETIC TEST DURING PREGNANCY: ICD-10-CM

## 2023-08-21 DIAGNOSIS — O28.5 ABNORMAL GENETIC TEST DURING PREGNANCY: Primary | ICD-10-CM

## 2023-08-21 PROCEDURE — 76811 OB US DETAILED SNGL FETUS: CPT | Performed by: OBSTETRICS & GYNECOLOGY

## 2023-10-16 ENCOUNTER — OFFICE VISIT (OUTPATIENT)
Dept: PERINATAL CARE | Facility: HOSPITAL | Age: 28
End: 2023-10-16
Attending: OBSTETRICS & GYNECOLOGY
Payer: COMMERCIAL

## 2023-10-16 VITALS
DIASTOLIC BLOOD PRESSURE: 64 MMHG | WEIGHT: 196 LBS | BODY MASS INDEX: 28.06 KG/M2 | HEART RATE: 82 BPM | HEIGHT: 70 IN | SYSTOLIC BLOOD PRESSURE: 117 MMHG

## 2023-10-16 DIAGNOSIS — O28.5 ABNORMAL GENETIC TEST DURING PREGNANCY: ICD-10-CM

## 2023-10-16 DIAGNOSIS — O28.5 ABNORMAL GENETIC TEST DURING PREGNANCY: Primary | ICD-10-CM

## 2023-10-16 LAB — HIV RESULT OB: NEGATIVE

## 2023-10-16 PROCEDURE — 76816 OB US FOLLOW-UP PER FETUS: CPT | Performed by: OBSTETRICS & GYNECOLOGY

## 2023-10-31 ENCOUNTER — TELEPHONE (OUTPATIENT)
Dept: FAMILY MEDICINE CLINIC | Facility: CLINIC | Age: 28
End: 2023-10-31

## 2023-10-31 NOTE — TELEPHONE ENCOUNTER
Last OV 3/30/23    Called pt and left vm and directed pt to go to the [facility] for her cough as our office has no availability.   Or if she would want to contact her gyne for assistance

## 2023-10-31 NOTE — TELEPHONE ENCOUNTER
Patient calling with a chronic cough for about 10 weeks & is (30 weeks pregnant) along with post nasal drip. She did not test for covid. She would like to be seen asap -  Please advise.

## 2023-11-01 ENCOUNTER — E-VISIT (OUTPATIENT)
Dept: TELEHEALTH | Age: 28
End: 2023-11-01
Payer: COMMERCIAL

## 2023-11-01 DIAGNOSIS — R05.2 SUBACUTE COUGH: Primary | ICD-10-CM

## 2023-11-01 PROCEDURE — 99423 OL DIG E/M SVC 21+ MIN: CPT | Performed by: PHYSICIAN ASSISTANT

## 2023-11-01 RX ORDER — DIPHENHYDRAMINE HYDROCHLORIDE 25 MG/1
CAPSULE ORAL
COMMUNITY
Start: 2023-05-22

## 2023-11-01 RX ORDER — AZITHROMYCIN 250 MG/1
TABLET, FILM COATED ORAL
Qty: 6 TABLET | Refills: 0 | Status: SHIPPED | OUTPATIENT
Start: 2023-11-01 | End: 2023-11-05

## 2023-11-01 RX ORDER — LORATADINE 10 MG/1
CAPSULE, LIQUID FILLED ORAL
COMMUNITY

## 2023-11-01 NOTE — PROGRESS NOTES
Arin Ruiz is a 33year old female who initiated e-visit care today. HPI:   See answers to questionnaire submission     Current Outpatient Medications   Medication Sig Dispense Refill    Pyridoxine HCl (VITAMIN B-6) 25 MG Oral Tab       Fluticasone Propionate (FLONASE NA)       Loratadine (CLARITIN) 10 MG Oral Cap       FAMOTIDINE OR Take by mouth. Cholecalciferol (VITAMIN D3) 1.25 MG (20665 UT) Oral Cap Take 1 capsule by mouth daily. ferrous sulfate 325 (65 FE) MG Oral Tab EC Take 1 tablet (325 mg total) by mouth daily with breakfast.      Prenatal Vit-Fe Fum-FA-Omega (PNV PRENATAL PLUS MULTIVIT+DHA) 27-1 & 312 MG Oral Misc Take 1 tablet by mouth daily. 80 each 0      Past Medical History:   Diagnosis Date    Anemia     Pneumonia     Retained portions of placenta or amniotic membrane after delivery 2022    Formatting of this note might be different from the original. Added automatically from request for surgery [id number]    Retained products of conception after delivery with complications 3/53/9318    Formatting of this note might be different from the original. Added automatically from request for surgery [id number]    Rh negative status during pregnancy 6/3/2020    Repeat antibody screen at 27-28 wks Rhogam IM within 6 days of labs       Past Surgical History:   Procedure Laterality Date    D & C  3 in , 1 in     Retained plscenta after both babies.  Took 3x with baby 1, 1 time with baby 2    FOOT SURGERY  2012    HYSTEROSCOPY  2020    possible retained products of conception      Aug 2020 ans May 2022    Retained placenta and surgery to repair hematoma on stitch line. 4 D&Cs    OTHER  2020    Obstetrical Laceration Revision and Evacuation of Hematoma    OTHER SURGICAL HISTORY      Hymenectomy, wisdom teeth, tear repair post childbirth    PARTIAL REMOVAL OF HYMEN      WISDOM TEETH REMOVED        Family History   Problem Relation Age of Onset Heart Disorder Mother         Mom, grandma, great grandla maternal uncles all with sudden electrical heart problems- 4 resulted in death. Theyve done studies on my family but dont know whats happening. I see a cardiologist.    Cancer Mother         Skin    Anemia Mother     Other (Electrical Herat Issues) Mother     Prostate Cancer Father     Heart Disease Maternal Grandmother         passed away suddenly when heart stopped per patient    Other (Electrical Herat Issues) Maternal Grandmother     Other (Electrical Herat Issues) Paternal Grandmother     Other (Lung Cancer) Paternal Grandfather     Other (Electrical Herat Issues) Maternal Uncle     Other (Electrical Herat Issues) Maternal Uncle     Skin cancer Other       Social History:  Social History     Socioeconomic History    Marital status:    Tobacco Use    Smoking status: Never    Smokeless tobacco: Never   Vaping Use    Vaping Use: Never used   Substance and Sexual Activity    Alcohol use:  Yes     Alcohol/week: 2.0 standard drinks of alcohol     Types: 1 Glasses of wine, 1 Cans of beer per week     Comment: 1-2 wine/beer weekly    Drug use: Never    Sexual activity: Yes     Partners: Male     Birth control/protection: Condom   Other Topics Concern    Caffeine Concern Yes     Comment: 1 cup of coffee daily and 1 cup of diet coke daily    Exercise Yes     Comment: 7 x weekly    Seat Belt Yes   Social Determinants of Health  Financial Resource Strain: Low Risk  (7/24/2023)      Financial Resource Strain          Difficulty of Paying Living Expenses: Not hard at all          Med Affordability: No  Food Insecurity: No Food Insecurity (7/24/2023)      Food Insecurity          Food Insecurity: Never true  Transportation Needs: No Transportation Needs (7/24/2023)      Transportation Needs          Lack of Transportation: No  Stress: No Stress Concern Present (7/24/2023)      Stress          Feeling of Stress : No  Housing Stability: Low Risk  (7/24/2023) Housing Stability          Housing Instability: No      ASSESSMENT AND PLAN:       Diagnoses and all orders for this visit:    Subacute cough  -     azithromycin (ZITHROMAX Z-SHAI) 250 MG Oral Tab; Take 2 tablets (500 mg total) by mouth daily for 1 day, THEN 1 tablet (250 mg total) daily for 4 days. Patient with 10 wk hx of cough. Has had mild cold sx intermittently. She is currently 30wks pregnant. She has been using Claritin, Flonase and cough suppressants without relief. She is using Famotidine nightly for reflux and TUMS during day time. Options discussed, will treat with Roland Peppers for potential atypical bacterial infection. Also recommend increasing famotidine to BID if cough does not improve. Advised need for in person eval at  if cough not improving or worsening symptoms. Duration of  the service:  28 minutes      See [employer] message exchange and Patient Instructions for Comfort Care and patient education.

## 2023-11-27 ENCOUNTER — ULTRASOUND ENCOUNTER (OUTPATIENT)
Dept: PERINATAL CARE | Facility: HOSPITAL | Age: 28
End: 2023-11-27
Attending: OBSTETRICS & GYNECOLOGY
Payer: COMMERCIAL

## 2023-11-27 VITALS
WEIGHT: 203 LBS | SYSTOLIC BLOOD PRESSURE: 109 MMHG | DIASTOLIC BLOOD PRESSURE: 61 MMHG | BODY MASS INDEX: 29.06 KG/M2 | HEART RATE: 82 BPM | HEIGHT: 70 IN

## 2023-11-27 DIAGNOSIS — O28.5 ABNORMAL GENETIC TEST DURING PREGNANCY: ICD-10-CM

## 2023-11-27 DIAGNOSIS — O28.5 ABNORMAL GENETIC TEST DURING PREGNANCY: Primary | ICD-10-CM

## 2023-11-27 DIAGNOSIS — O09.293: ICD-10-CM

## 2023-11-27 PROCEDURE — 76816 OB US FOLLOW-UP PER FETUS: CPT | Performed by: OBSTETRICS & GYNECOLOGY

## 2023-11-27 PROCEDURE — 76819 FETAL BIOPHYS PROFIL W/O NST: CPT

## 2023-12-07 ENCOUNTER — HOSPITAL ENCOUNTER (OUTPATIENT)
Dept: MRI IMAGING | Facility: HOSPITAL | Age: 28
Discharge: HOME | End: 2023-12-07
Attending: OBSTETRICS & GYNECOLOGY
Payer: COMMERCIAL

## 2023-12-07 DIAGNOSIS — O43.213 PLACENTA ACCRETA IN THIRD TRIMESTER: ICD-10-CM

## 2023-12-07 PROCEDURE — 72195 MRI PELVIS W/O DYE: CPT | Performed by: OBSTETRICS & GYNECOLOGY

## 2023-12-11 LAB — STREP GP B CULT OB: NEGATIVE

## 2023-12-19 ENCOUNTER — TELEPHONE (OUTPATIENT)
Dept: OBGYN UNIT | Facility: HOSPITAL | Age: 28
End: 2023-12-19

## 2023-12-20 ENCOUNTER — TELEPHONE (OUTPATIENT)
Dept: PERINATAL CARE | Facility: HOSPITAL | Age: 28
End: 2023-12-20

## 2023-12-21 ENCOUNTER — TELEPHONE (OUTPATIENT)
Dept: OBGYN UNIT | Facility: HOSPITAL | Age: 28
End: 2023-12-21

## 2023-12-21 ENCOUNTER — HOSPITAL ENCOUNTER (OUTPATIENT)
Facility: HOSPITAL | Age: 28
Discharge: HOME | End: 2023-12-21
Attending: OBSTETRICS & GYNECOLOGY | Admitting: OBSTETRICS & GYNECOLOGY
Payer: COMMERCIAL

## 2023-12-21 VITALS
HEART RATE: 89 BPM | SYSTOLIC BLOOD PRESSURE: 119 MMHG | RESPIRATION RATE: 16 BRPM | TEMPERATURE: 98 F | DIASTOLIC BLOOD PRESSURE: 79 MMHG

## 2023-12-21 PROCEDURE — 59412 ANTEPARTUM MANIPULATION: CPT

## 2023-12-21 PROCEDURE — 59025 FETAL NON-STRESS TEST: CPT

## 2023-12-21 PROCEDURE — 96372 THER/PROPH/DIAG INJ SC/IM: CPT

## 2023-12-21 RX ORDER — TERBUTALINE SULFATE 1 MG/ML
0.25 INJECTION, SOLUTION SUBCUTANEOUS ONCE
Status: COMPLETED | OUTPATIENT
Start: 2023-12-21 | End: 2023-12-21

## 2023-12-21 NOTE — DISCHARGE INSTRUCTIONS
Discharge Instructions    Diet: Regular  Activity: Normal activity         General Instructions    Call your [facility] doctor if: Fluid leaking from your vagina;Uterine contractions increasing in intensity and frequency;Vaginal bleeding;Vaginal or rectal pressure; Temperature greater than 100F;Decrease in fetal movement;Uterine contractions 10 minutes or closer for 1 to 2 hours

## 2023-12-21 NOTE — BRIEF PROCEDURE NOTE
Brief Procedure Note    Patient Name: Elton Gillette    Primary Surgeon: Bar Nowak M.D. Assistant: None    Findings:   The patient was placed on continuous fetal heart rate monitoring before the procedure. A reactive fetal heart rate tracing was noted without decelerations. A preprocedure ultrasound was performed which confirmed a breech presentation. The MACK was 14.4. I counseled the patient regarding the risks and benefits of external cephalic version. The patient agreed to the procedure after counseling. Subcutaneous terbutaline (0.25 mg) was administered . The breech was lifted from the maternal pelvis and attempt was made at a forward roll while concurrently applying pressure to the fetal head. The version was UNSUCCESSFUL. Fetal heart tones were obtained and were normal.  The patient was then placed on continuous fetal heart rate monitoring. Complications: NONE    Condition: Stable    Estimated Blood Loss: N/A    Bar Walters.  Rina Nowak M.D.  12/21/23  8:14 AM

## 2023-12-21 NOTE — PROGRESS NOTES
Discharge instructions discussed with pt and , all questions answered at this time. Pt left unit in stable condition, ambulatory, with all personal belongings, accompanied by .

## 2023-12-22 ENCOUNTER — TELEPHONE (OUTPATIENT)
Dept: OBGYN UNIT | Facility: HOSPITAL | Age: 28
End: 2023-12-22

## 2023-12-22 NOTE — PROGRESS NOTES
Pt given arrival instructions (0800). RN reviewed  procedure, general plan of care, and  pain medication. Questions answered. Pt verbalizes understanding.

## 2024-01-01 ENCOUNTER — ANESTHESIA EVENT (OUTPATIENT)
Dept: OBGYN UNIT | Facility: HOSPITAL | Age: 29
End: 2024-01-01
Payer: COMMERCIAL

## 2024-01-02 ENCOUNTER — ANESTHESIA (OUTPATIENT)
Dept: OBGYN UNIT | Facility: HOSPITAL | Age: 29
End: 2024-01-02
Payer: COMMERCIAL

## 2024-01-02 ENCOUNTER — HOSPITAL ENCOUNTER (INPATIENT)
Facility: HOSPITAL | Age: 29
LOS: 2 days | Discharge: HOME | End: 2024-01-04
Attending: OBSTETRICS & GYNECOLOGY | Admitting: OBSTETRICS & GYNECOLOGY
Payer: COMMERCIAL

## 2024-01-02 PROBLEM — Z34.90 PREGNANCY: Status: ACTIVE | Noted: 2024-01-02

## 2024-01-02 PROBLEM — Z34.90 PREGNANCY (HCC): Status: ACTIVE | Noted: 2024-01-02

## 2024-01-02 LAB
ANTIBODY SCREEN: NEGATIVE
BASOPHILS # BLD AUTO: 0.06 X10(3) UL (ref 0–0.2)
BASOPHILS NFR BLD AUTO: 1 %
EOSINOPHIL # BLD AUTO: 0.06 X10(3) UL (ref 0–0.7)
EOSINOPHIL NFR BLD AUTO: 1 %
ERYTHROCYTE [DISTWIDTH] IN BLOOD BY AUTOMATED COUNT: 13.2 %
HCT VFR BLD AUTO: 34.1 %
HGB BLD-MCNC: 12 G/DL
IMM GRANULOCYTES # BLD AUTO: 0.08 X10(3) UL (ref 0–1)
IMM GRANULOCYTES NFR BLD: 1.3 %
LYMPHOCYTES # BLD AUTO: 1.6 X10(3) UL (ref 1–4)
LYMPHOCYTES NFR BLD AUTO: 25.8 %
MCH RBC QN AUTO: 31.8 PG (ref 26–34)
MCHC RBC AUTO-ENTMCNC: 35.2 G/DL (ref 31–37)
MCV RBC AUTO: 90.5 FL
MONOCYTES # BLD AUTO: 0.42 X10(3) UL (ref 0.1–1)
MONOCYTES NFR BLD AUTO: 6.8 %
NEUTROPHILS # BLD AUTO: 3.97 X10 (3) UL (ref 1.5–7.7)
NEUTROPHILS # BLD AUTO: 3.97 X10(3) UL (ref 1.5–7.7)
NEUTROPHILS NFR BLD AUTO: 64.1 %
PLATELET # BLD AUTO: 145 10(3)UL (ref 150–450)
RBC # BLD AUTO: 3.77 X10(6)UL
RH BLOOD TYPE: POSITIVE
WBC # BLD AUTO: 6.2 X10(3) UL (ref 4–11)

## 2024-01-02 PROCEDURE — 86780 TREPONEMA PALLIDUM: CPT | Performed by: OBSTETRICS & GYNECOLOGY

## 2024-01-02 PROCEDURE — 88307 TISSUE EXAM BY PATHOLOGIST: CPT | Performed by: OBSTETRICS & GYNECOLOGY

## 2024-01-02 PROCEDURE — 86850 RBC ANTIBODY SCREEN: CPT | Performed by: OBSTETRICS & GYNECOLOGY

## 2024-01-02 PROCEDURE — 85025 COMPLETE CBC W/AUTO DIFF WBC: CPT | Performed by: OBSTETRICS & GYNECOLOGY

## 2024-01-02 PROCEDURE — 0W3R0ZZ CONTROL BLEEDING IN GENITOURINARY TRACT, OPEN APPROACH: ICD-10-PCS | Performed by: OBSTETRICS & GYNECOLOGY

## 2024-01-02 PROCEDURE — 86900 BLOOD TYPING SEROLOGIC ABO: CPT | Performed by: OBSTETRICS & GYNECOLOGY

## 2024-01-02 PROCEDURE — 86901 BLOOD TYPING SEROLOGIC RH(D): CPT | Performed by: OBSTETRICS & GYNECOLOGY

## 2024-01-02 PROCEDURE — 86920 COMPATIBILITY TEST SPIN: CPT

## 2024-01-02 RX ORDER — DIPHENHYDRAMINE HYDROCHLORIDE 50 MG/ML
12.5 INJECTION INTRAMUSCULAR; INTRAVENOUS EVERY 4 HOURS PRN
Status: DISCONTINUED | OUTPATIENT
Start: 2024-01-02 | End: 2024-01-04

## 2024-01-02 RX ORDER — HYDROMORPHONE HYDROCHLORIDE 1 MG/ML
0.2 INJECTION, SOLUTION INTRAMUSCULAR; INTRAVENOUS; SUBCUTANEOUS EVERY 5 MIN PRN
Status: DISCONTINUED | OUTPATIENT
Start: 2024-01-02 | End: 2024-01-02 | Stop reason: HOSPADM

## 2024-01-02 RX ORDER — ACETAMINOPHEN 500 MG
1000 TABLET ORAL ONCE
Status: COMPLETED | OUTPATIENT
Start: 2024-01-02 | End: 2024-01-02

## 2024-01-02 RX ORDER — DIPHENHYDRAMINE HCL 25 MG
25 CAPSULE ORAL EVERY 4 HOURS PRN
Status: DISCONTINUED | OUTPATIENT
Start: 2024-01-02 | End: 2024-01-04

## 2024-01-02 RX ORDER — KETOROLAC TROMETHAMINE 30 MG/ML
30 INJECTION, SOLUTION INTRAMUSCULAR; INTRAVENOUS EVERY 6 HOURS
Qty: 4 ML | Refills: 0 | Status: DISPENSED | OUTPATIENT
Start: 2024-01-02 | End: 2024-01-03

## 2024-01-02 RX ORDER — METOCLOPRAMIDE HYDROCHLORIDE 5 MG/ML
10 INJECTION INTRAMUSCULAR; INTRAVENOUS EVERY 6 HOURS PRN
Status: DISCONTINUED | OUTPATIENT
Start: 2024-01-02 | End: 2024-01-04

## 2024-01-02 RX ORDER — NALOXONE HYDROCHLORIDE 0.4 MG/ML
0.08 INJECTION, SOLUTION INTRAMUSCULAR; INTRAVENOUS; SUBCUTANEOUS
Status: ACTIVE | OUTPATIENT
Start: 2024-01-02 | End: 2024-01-03

## 2024-01-02 RX ORDER — PHENYLEPHRINE HCL 10 MG/ML
VIAL (ML) INJECTION AS NEEDED
Status: DISCONTINUED | OUTPATIENT
Start: 2024-01-02 | End: 2024-01-02 | Stop reason: SURG

## 2024-01-02 RX ORDER — MORPHINE SULFATE 2 MG/ML
INJECTION, SOLUTION INTRAMUSCULAR; INTRAVENOUS AS NEEDED
Status: DISCONTINUED | OUTPATIENT
Start: 2024-01-02 | End: 2024-01-02 | Stop reason: SURG

## 2024-01-02 RX ORDER — EPHEDRINE SULFATE 50 MG/ML
INJECTION INTRAVENOUS AS NEEDED
Status: DISCONTINUED | OUTPATIENT
Start: 2024-01-02 | End: 2024-01-02 | Stop reason: SURG

## 2024-01-02 RX ORDER — CITRIC ACID/SODIUM CITRATE 334-500MG
30 SOLUTION, ORAL ORAL ONCE
Status: COMPLETED | OUTPATIENT
Start: 2024-01-02 | End: 2024-01-02

## 2024-01-02 RX ORDER — SIMETHICONE 80 MG
80 TABLET,CHEWABLE ORAL 3 TIMES DAILY PRN
Status: DISCONTINUED | OUTPATIENT
Start: 2024-01-02 | End: 2024-01-04

## 2024-01-02 RX ORDER — DOCUSATE SODIUM 100 MG/1
100 CAPSULE, LIQUID FILLED ORAL
Status: DISCONTINUED | OUTPATIENT
Start: 2024-01-02 | End: 2024-01-04

## 2024-01-02 RX ORDER — BUPIVACAINE HYDROCHLORIDE 7.5 MG/ML
INJECTION, SOLUTION INTRASPINAL AS NEEDED
Status: DISCONTINUED | OUTPATIENT
Start: 2024-01-02 | End: 2024-01-02 | Stop reason: SURG

## 2024-01-02 RX ORDER — GABAPENTIN 300 MG/1
300 CAPSULE ORAL EVERY 8 HOURS PRN
Status: DISCONTINUED | OUTPATIENT
Start: 2024-01-02 | End: 2024-01-04

## 2024-01-02 RX ORDER — KETOROLAC TROMETHAMINE 30 MG/ML
INJECTION, SOLUTION INTRAMUSCULAR; INTRAVENOUS
Status: COMPLETED
Start: 2024-01-02 | End: 2024-01-02

## 2024-01-02 RX ORDER — KETOROLAC TROMETHAMINE 30 MG/ML
30 INJECTION, SOLUTION INTRAMUSCULAR; INTRAVENOUS ONCE
Status: COMPLETED | OUTPATIENT
Start: 2024-01-02 | End: 2024-01-02

## 2024-01-02 RX ORDER — ACETAMINOPHEN 500 MG
1000 TABLET ORAL EVERY 6 HOURS
Status: DISCONTINUED | OUTPATIENT
Start: 2024-01-02 | End: 2024-01-04

## 2024-01-02 RX ORDER — ONDANSETRON 2 MG/ML
4 INJECTION INTRAMUSCULAR; INTRAVENOUS EVERY 6 HOURS PRN
Status: DISCONTINUED | OUTPATIENT
Start: 2024-01-02 | End: 2024-01-02 | Stop reason: HOSPADM

## 2024-01-02 RX ORDER — TRANEXAMIC ACID 10 MG/ML
INJECTION, SOLUTION INTRAVENOUS AS NEEDED
Status: DISCONTINUED | OUTPATIENT
Start: 2024-01-02 | End: 2024-01-02 | Stop reason: SURG

## 2024-01-02 RX ORDER — NALBUPHINE HYDROCHLORIDE 10 MG/ML
2.5 INJECTION, SOLUTION INTRAMUSCULAR; INTRAVENOUS; SUBCUTANEOUS
Status: DISCONTINUED | OUTPATIENT
Start: 2024-01-02 | End: 2024-01-02 | Stop reason: HOSPADM

## 2024-01-02 RX ORDER — NALBUPHINE HYDROCHLORIDE 10 MG/ML
2.5 INJECTION, SOLUTION INTRAMUSCULAR; INTRAVENOUS; SUBCUTANEOUS EVERY 4 HOURS PRN
Status: DISCONTINUED | OUTPATIENT
Start: 2024-01-02 | End: 2024-01-04

## 2024-01-02 RX ORDER — DEXTROSE, SODIUM CHLORIDE, SODIUM LACTATE, POTASSIUM CHLORIDE, AND CALCIUM CHLORIDE 5; .6; .31; .03; .02 G/100ML; G/100ML; G/100ML; G/100ML; G/100ML
INJECTION, SOLUTION INTRAVENOUS CONTINUOUS PRN
Status: DISCONTINUED | OUTPATIENT
Start: 2024-01-02 | End: 2024-01-04

## 2024-01-02 RX ORDER — ONDANSETRON 2 MG/ML
4 INJECTION INTRAMUSCULAR; INTRAVENOUS EVERY 6 HOURS PRN
Status: DISCONTINUED | OUTPATIENT
Start: 2024-01-02 | End: 2024-01-04

## 2024-01-02 RX ORDER — MISOPROSTOL 200 UG/1
TABLET ORAL
Status: DISCONTINUED
Start: 2024-01-02 | End: 2024-01-02 | Stop reason: WASHOUT

## 2024-01-02 RX ORDER — SODIUM CHLORIDE, SODIUM LACTATE, POTASSIUM CHLORIDE, CALCIUM CHLORIDE 600; 310; 30; 20 MG/100ML; MG/100ML; MG/100ML; MG/100ML
INJECTION, SOLUTION INTRAVENOUS CONTINUOUS
Status: DISCONTINUED | OUTPATIENT
Start: 2024-01-02 | End: 2024-01-04

## 2024-01-02 RX ORDER — BISACODYL 10 MG
10 SUPPOSITORY, RECTAL RECTAL ONCE AS NEEDED
Status: DISCONTINUED | OUTPATIENT
Start: 2024-01-02 | End: 2024-01-04

## 2024-01-02 RX ORDER — CARBOPROST TROMETHAMINE 250 UG/ML
INJECTION, SOLUTION INTRAMUSCULAR
Status: DISCONTINUED
Start: 2024-01-02 | End: 2024-01-02 | Stop reason: WASHOUT

## 2024-01-02 RX ORDER — METHYLERGONOVINE MALEATE 0.2 MG/ML
INJECTION INTRAVENOUS
Status: DISCONTINUED
Start: 2024-01-02 | End: 2024-01-02 | Stop reason: WASHOUT

## 2024-01-02 RX ORDER — IBUPROFEN 600 MG/1
600 TABLET ORAL EVERY 6 HOURS
Status: DISCONTINUED | OUTPATIENT
Start: 2024-01-03 | End: 2024-01-04

## 2024-01-02 RX ORDER — TRANEXAMIC ACID 10 MG/ML
INJECTION, SOLUTION INTRAVENOUS
Status: DISPENSED
Start: 2024-01-02 | End: 2024-01-02

## 2024-01-02 RX ORDER — ONDANSETRON 2 MG/ML
4 INJECTION INTRAMUSCULAR; INTRAVENOUS ONCE AS NEEDED
Status: DISCONTINUED | OUTPATIENT
Start: 2024-01-02 | End: 2024-01-02 | Stop reason: HOSPADM

## 2024-01-02 RX ORDER — ONDANSETRON 2 MG/ML
INJECTION INTRAMUSCULAR; INTRAVENOUS AS NEEDED
Status: DISCONTINUED | OUTPATIENT
Start: 2024-01-02 | End: 2024-01-02 | Stop reason: SURG

## 2024-01-02 RX ORDER — CEFAZOLIN SODIUM/WATER 2 G/20 ML
2 SYRINGE (ML) INTRAVENOUS ONCE
Status: COMPLETED | OUTPATIENT
Start: 2024-01-02 | End: 2024-01-02

## 2024-01-02 RX ORDER — MULTIVITAMIN WITH IRON
250 TABLET ORAL
COMMUNITY

## 2024-01-02 RX ORDER — SODIUM CHLORIDE, SODIUM LACTATE, POTASSIUM CHLORIDE, CALCIUM CHLORIDE 600; 310; 30; 20 MG/100ML; MG/100ML; MG/100ML; MG/100ML
125 INJECTION, SOLUTION INTRAVENOUS CONTINUOUS
Status: DISCONTINUED | OUTPATIENT
Start: 2024-01-02 | End: 2024-01-02 | Stop reason: HOSPADM

## 2024-01-02 RX ADMIN — SODIUM CHLORIDE, SODIUM LACTATE, POTASSIUM CHLORIDE, CALCIUM CHLORIDE: 600; 310; 30; 20 INJECTION, SOLUTION INTRAVENOUS at 12:03:00

## 2024-01-02 RX ADMIN — BUPIVACAINE HYDROCHLORIDE 1.7 ML: 7.5 INJECTION, SOLUTION INTRASPINAL at 11:22:00

## 2024-01-02 RX ADMIN — SODIUM CHLORIDE, SODIUM LACTATE, POTASSIUM CHLORIDE, CALCIUM CHLORIDE: 600; 310; 30; 20 INJECTION, SOLUTION INTRAVENOUS at 12:17:00

## 2024-01-02 RX ADMIN — EPHEDRINE SULFATE 10 MG: 50 INJECTION INTRAVENOUS at 11:26:00

## 2024-01-02 RX ADMIN — ONDANSETRON 4 MG: 2 INJECTION INTRAMUSCULAR; INTRAVENOUS at 11:37:00

## 2024-01-02 RX ADMIN — SODIUM CHLORIDE, SODIUM LACTATE, POTASSIUM CHLORIDE, CALCIUM CHLORIDE: 600; 310; 30; 20 INJECTION, SOLUTION INTRAVENOUS at 11:16:00

## 2024-01-02 RX ADMIN — MORPHINE SULFATE 0.2 MG: 2 INJECTION, SOLUTION INTRAMUSCULAR; INTRAVENOUS at 11:22:00

## 2024-01-02 RX ADMIN — SODIUM CHLORIDE, SODIUM LACTATE, POTASSIUM CHLORIDE, CALCIUM CHLORIDE: 600; 310; 30; 20 INJECTION, SOLUTION INTRAVENOUS at 12:26:00

## 2024-01-02 RX ADMIN — CEFAZOLIN SODIUM/WATER 2 G: 2 G/20 ML SYRINGE (ML) INTRAVENOUS at 11:16:00

## 2024-01-02 RX ADMIN — PHENYLEPHRINE HCL 100 MCG: 10 MG/ML VIAL (ML) INJECTION at 11:37:00

## 2024-01-02 RX ADMIN — SODIUM CHLORIDE, SODIUM LACTATE, POTASSIUM CHLORIDE, CALCIUM CHLORIDE: 600; 310; 30; 20 INJECTION, SOLUTION INTRAVENOUS at 11:49:00

## 2024-01-02 RX ADMIN — PHENYLEPHRINE HCL 100 MCG: 10 MG/ML VIAL (ML) INJECTION at 11:25:00

## 2024-01-02 RX ADMIN — TRANEXAMIC ACID 1000 MG: 10 INJECTION, SOLUTION INTRAVENOUS at 11:42:00

## 2024-01-02 RX ADMIN — SODIUM CHLORIDE, SODIUM LACTATE, POTASSIUM CHLORIDE, CALCIUM CHLORIDE: 600; 310; 30; 20 INJECTION, SOLUTION INTRAVENOUS at 11:30:00

## 2024-01-02 NOTE — H&P
[facility]    PATIENT'S NAME: DIANE BRANDT   ATTENDING PHYSICIAN: Nellie Macdonald M.D.   PATIENT ACCOUNT#:   [id number]    LOCATION:    MEDICAL RECORD #:   [id number]       YOB: 1995  ADMISSION DATE:       2024    HISTORY AND PHYSICAL EXAMINATION    HISTORY OF PRESENT ILLNESS:  She is a 28-year-old female.  She is a  3, para 2-0-0-2.  She has a due date of 2024, confirmed with first trimester ultrasound.  She is coming in for an elective primary  section due to breech presentation of fetus.  She is 39 weeks on admission.  She had an attempted external cephalic version performed by Dr. Louis on  that was unsuccessful.  Her prenatal care has been good.  It was started early, and she had the following lab work done.  On 2022, she had a hemoglobin of 13, hematocrit of 41, and platelets of 216.  On 2022, hemoglobin was 12 and platelets of 177 and hematocrit of 37.  She had other blood work done which is as follows.  On May 11, 2023, she had initial lab work done for her pregnancy showing blood type of O positive.  Antibody screen negative.  Hemoglobin of 12.1 and hematocrit of 37.2.  Her platelets were 168.  Her ferritin was 29, and her RPR was nonreactive.  She also had on that same date hepatitis B surface antigen negative.  HIV negative.  Rubella immune.  Chlamydia and gonorrhea done, and those were negative on that same date.  She had had a Pap smear done earlier in the year, in 2022 and that was normal.  Further lab work, she had a vitamin D done on the same day which was 30.4.  Her parvovirus was nonimmune.  She further went on to have a 1-hour Glucola done on 2023 that was 93.  At that point, she had a hemoglobin of 11.7 and hematocrit 35.4, platelets of 193.  She was placed on iron and repeat labs were done.  On November 15, hemoglobin of 11.1, hematocrit 32, and platelets of 167, ferritin of 19, and she has  been instructed to take iron.  Her group beta strep was done on 12/11 and that showed negative results.  The patient also underwent a Panorama test at the normal time in the pregnancy which showed atypical findings on sex chromosomes.  Amniocentesis was normal, however. She underwent an ultrasound with MFM where there was a small suspected area of placenta accreta  and she subsequently had an MRI which showed no strong evidence of accreta.     PAST MEDICAL HISTORY:  She has a history of adenomyosis.  There is a history of anemia with prior pregnancies, history of a perineal hematoma 5 days after her first delivery, and history of retained products of conception, placenta tissue after both of her prior pregnancies.  She had the diagnosis of placental site nodule after the first delivery by hysteroscopy and pathology.     PAST SURGICAL HISTORY:  She has had operative hysteroscopy done as follows, on 11/ 20 was done with Dr. Quesada following the delivery of her first child as she continued to have bleeding after delivery and there was a suspected retained products of conception and the surgical sample was lost.  She continued to have persistent bleeding and then had another procedure on December 4, 2020 with Dr. Silva under ultrasound guidance and that was diagnosed as a placental site nodule, and then underwent a third procedure April 15, 2021 with Dr. Shawn Pozo at [facility] which showed benign proliferative endometrium and superficial myometrium.  She then underwent a fourth procedure after the delivery of her second child as she had some abnormal bleeding afterward with retained  placental tissue.  She subsequently underwent  a hysteroscopy with d and c on August 15, 2022 that showed retained products of conception and chronic endometritis with suspected adenomyosis as well; that was done by Dr. Pozo at [facility].      REPRODUCTIVE HISTORY:  She had menarche at age 11, every 28 days for 6 days.  In 2020,  she had a delivery vaginally at 40 weeks with Dr. Quesada at [facility].  At that time, she had a second-degree laceration and then had postpartum day 5 suture breakdown with severe pain.  She had surgical revision of the repair and evacuation of the hematoma.  She also continued to bleed from her uterus and had retained products of conceptions that were noted on ultrasound.  Thus, she underwent hysteroscopy as mentioned with Dr. Quesada and then following that had another hysteroscopy with Dr. Silva, which diagnosed placental site nodule.  In May 2022, she had at 38 weeks an induction, 7 pounds 1 ounce male, vaginally delivered.  Her placenta was manually removed.  This was an induction for cholestasis.  She had a midline laceration, but did have persistent postpartum bleeding.  She did have manual removal of her placenta, and it examined at the delivery and  was felt to be completely removed at the time.  However, postpartum, she continued to have irregular bleeding and was noted to have probable retained placental tissue and then saw Dr. Escobar at [facility] again for hysteroscopy.      MEDICATIONS:  That she is on are iron 325 mg daily, prenatal vitamin daily, and then vitamin D3 at 5000 international units a day.    ALLERGIES:  There are no known allergies.    FAMILY HISTORY:  Significant for a maternal grandmother and 2 uncle suddenly passed away at 56 and 60.     SOCIAL HISTORY:  No smoking, no alcohol, no drug use during pregnancy.    PHYSICAL EXAMINATION:    GENERAL:  Patient is alert and oriented x3.  VITAL SIGNS:  The patient weighs 208 pounds, and she is 5 feet 9 inches tall.  Her BMI initially when she started the pregnancy was 27.9.  Her blood pressure is stable 124/72.  Her strip is reactive.    LUNGS:  Nonlabored breathing.  ABDOMEN:  Gravid, nontender.  Ultrasound was performed to confirm fetal position in the breech position.    PELVIC:  Not performed in the office and will be performed in the  operating room.    EXTREMITIES:  Minimal edema.  No evidence of DVT.    ASSESSMENT:    1.   Intrauterine pregnancy at 39 weeks.  2.   History of retained placenta x2 with 4 hysteroscopies due to retained placental tissue following her deliveries.  3.   Anemia.  4.   Breech fetus.  5.   Family history of heart condition, 's nephew, transposition of great vessels.   6.      Ultrasound by Maternal [facility]etal [facility] showing possible small area of placenta accreta in the right aspect of the fundal uterus.  MRI showing no strong evidence of placenta accreta, no myometrial interruption, no myometrial bulge or subplacental signal abnormality to strongly indicate placenta accreta.   7.   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  8.   Varicose veins.  9.   Coccyx pain.  Patient undergoing physical therapy and had trigger point injections during pregnancy.  10.   History of heart symptoms with the patient.  Cardiac workup was negative, Dr. Agustin Menon with [facility].      PLAN:  Proceed to  section as discussed with her and her  in great detail and the reasoning for that given that the baby is breech.  We also discussed in detail, given her prior history of retained placenta and bleeding postpartum, the risk of this occurring again.  We discussed that MRI did not strongly indicate the presence of an accreta; however, that this could still be a possibility and that there may be issues with post postpartum bleeding and possible retained tissue.  We discussed possible need for blood products.  We discussed possible alternative options including uterine artery embolization, placement of the Shira device, and possible alternative therapies if there is postpartum hemorrhage and accreta is found at the time of delivery.  Hysterectomy was also discussed with them in detail.  They verbalized good understanding of this conversation, and the risks with  this delivery given her prior history.  We also discussed that there is a significant chance that she may have retained tissue again that would necessitate further surgery postpartum.  Risks of the surgery were discussed in detail including risk of infection, injury to organs surrounding the uterus, blood vessels as well.  We discussed the risk of venous thromboembolism.  Anesthesia risks were also discussed in great detail.  Wound healing was also discussed.  They verbalized good understanding of this discussion.  The patient is scheduled at 10:30 a.m. on January 2.  All questions were asked and answered.  They also wanted immediate skin to skin with mom in the OR and I discussed limitations of this at the time of surgery.     Dictated By Nellie Macdonald M.D.  d: 01/01/2024 19:58:43  t: 01/01/2024 21:40:01  [facility] [id number]  LL/

## 2024-01-02 NOTE — PROGRESS NOTES
Patient transferred to mother/baby room 1105 per cart in stable condition with baby and personal belongings.  Accompanied by  and staff.  Patient remains cared for by L&D RN.

## 2024-01-02 NOTE — ANESTHESIA POSTPROCEDURE EVALUATION
[facility]    Gloria Vasquez Patient Status:  Inpatient   Age/Gender 28 year old female MRN [id number]   Location [facility] LABOR & DELIVERY Attending Nellie Macdonald MD, MD   Hosp Day # 0 PCP Florecita Novoa DO       Anesthesia Post-op Note     SECTION    Procedure Summary       Date: 24 Room / Location:  L+D OR  /  L+D OR    Anesthesia Start: 1116 Anesthesia Stop: 1251    Procedure:  SECTION (Abdomen) Diagnosis:     Surgeons: Nellie Macdonald MD, MD Anesthesiologist: Mookie Li MD    Anesthesia Type: spinal ASA Status: 2            Anesthesia Type: spinal    Vitals Value Taken Time   /122 24 1247   Temp 97.4 24 1251   Pulse 66 24 1251   Resp 18 24 1251   SpO2 97 % (RA) 24 1251   Vitals shown include unfiled device data.    Patient Location: Labor and Delivery    Anesthesia Type: spinal    Airway Patency: patent    Postop Pain Control: adequate    Mental Status: preanesthetic baseline    Nausea/Vomiting: none    Cardiopulmonary/Hydration status: stable euvolemic    Complications: no apparent anesthesia related complications    Postop vital signs: stable    Comments: Spinal with spinal Duramorph for postoperative pain control.    Dental Exam: Unchanged from Preop    Patient to be discharged from PACU when criteria met.

## 2024-01-02 NOTE — L&D DELIVERY NOTE
Christina, Girl [[id number]]      Labor Events     labor?: No   steroids?: None  Antibiotics received during labor?: Yes  Antibiotics (enter # doses in comment): cefazolin (Comment: 2g IVP, 500mg  Zithromax)  Rupture date/time: 2024 1141     Rupture type: AROM  Fluid color: Clear  Intrapartum & labor complications: Other - see comments  Intrapartum & labor complications comment: Breech       [place] Presentation    Presentation: Breech       Operative Delivery    Operative Vaginal Delivery: N/A                      Shoulder Dystocia    Shoulder Dystocia: N/A             Anesthesia    Method: Spinal               Delivery      Delivery date/time:  24 11:41:59   Delivery type: Caesarean Section  Breech presentation: Rashid    Details:   categorization: Primary    priority: scheduled   Indications for : Breech   Skin incision type: 1 Pfannenstiel   Uterine Incision type: Low Transverse      Delivery location: OR       Delivery Providers    Delivering Clinician: Nellie Macdonald MD, MD   Delivery personnel:  Provider Role   Chelsea Ibanez RN Baby Nurse   Brigida Og RN Delivery Nurse   Doug Manuel MD Neonatologist             Cord    Vessels: 3 Vessels  Complications: None  Timed cord clamping: Yes  Time in sec: 30  Cord blood disposition: to lab  Gases sent?: No       Resuscitation    Method: None       [place] Measurements      Weight: 3590 g 7 lb 14.6 oz Length: 52.1 cm     Head circum.: 37 cm Chest circum.: 33 cm          Abdominal circum.: 31 cm           Placenta    Date/time: 2024 1142  Removal: Manual Removal  Appearance: Intact  Disposition: Lab       Apgars    Living status: Living   Apgar Scoring Key:    0 1 2    Skin color Blue or pale Acrocyanotic Completely pink    Heart rate Absent <100 bpm >100 bpm    Reflex irritability No response Grimace Cry or active withdrawal    Muscle tone Limp Some flexion Active motion    Respiratory effort  Absent Weak cry; hypoventilation Good, crying              1 Minute:  5 Minute:  10 Minute:  15 Minute:  20 Minute:      Skin color: 1  1       Heart rate: 2  2       Reflex irritablity: 2  2       Muscle tone: 2  2       Respiratory effort: 2  2       Total: 9  9          Apgars assigned by: DR. ERICKSON  [place] disposition: with mother       Skin to Skin    No data filed       Vaginal Count    No data filed       Delivery (Maternal)    Episiotomy: None  Perineal lacerations: None      Vaginal laceration?: No      Cervical laceration?: No    Clitoral laceration?: No    Estimated blood loss (mL): 850

## 2024-01-02 NOTE — PROGRESS NOTES
Pt is a 28 year old female admitted to TRG3/TRG3-A.     Chief Complaint   Patient presents with    Scheduled      Primary , Breech       Pt is  39w1d intra-uterine pregnancy.  History obtained, consents signed. Oriented to room, staff, and plan of care.

## 2024-01-02 NOTE — PLAN OF CARE
Problem: BIRTH - VAGINAL/ SECTION  Goal: Fetal and maternal status remain reassuring during the birth process  Description: INTERVENTIONS:  - Monitor vital signs  - Monitor fetal heart rate  - Monitor uterine activity  - Monitor labor progression (vaginal delivery)  - DVT prophylaxis (C/S delivery)  - Surgical antibiotic prophylaxis (C/S delivery)  Outcome: Progressing     Problem: PAIN - ADULT  Goal: Verbalizes/displays adequate comfort level or patient's stated pain goal  Description: INTERVENTIONS:  - Encourage pt to monitor pain and request assistance  - Assess pain using appropriate pain scale  - Administer analgesics based on type and severity of pain and evaluate response  - Implement non-pharmacological measures as appropriate and evaluate response  - Consider cultural and social influences on pain and pain management  - Manage/alleviate anxiety  - Utilize distraction and/or relaxation techniques  - Monitor for opioid side effects  - Notify MD/LIP if interventions unsuccessful or patient reports new pain  - Anticipate increased pain with activity and pre-medicate as appropriate  Outcome: Progressing     Problem: ANXIETY  Goal: Will report anxiety at manageable levels  Description: INTERVENTIONS:  - Administer medication as ordered  - Teach and rehearse alternative coping skills  - Provide emotional support with 1:1 interaction with staff  Outcome: Progressing     Problem: Patient/Family Goals  Goal: Patient/Family Long Term Goal  Description: Patient's Long Term Goal: uncomplicated delivery     Interventions:  -   - See additional Care Plan goals for specific interventions  Outcome: Progressing  Goal: Patient/Family Short Term Goal  Description: Patient's Short Term Goal: pain control     Interventions:   -   - See additional Care Plan goals for specific interventions  Outcome: Progressing

## 2024-01-02 NOTE — ANESTHESIA PREPROCEDURE EVALUATION
PRE-OP EVALUATION    Patient Name: Gloria Vasquez    Admit Diagnosis: pregnancy  Pregnancy    Pre-op Diagnosis: * No pre-op diagnosis entered *     SECTION    Anesthesia Procedure:  SECTION (Abdomen)    Surgeon(s) and Role:     * Nellie Macdonald MD, MD - Primary    Pre-op vitals reviewed.  Temp: 98.5 °F (36.9 °C)  Pulse: 74  Resp: 16  BP: 114/70     Body mass index is 29.56 kg/m².    Current medications reviewed.  Hospital Medications:  • [COMPLETED] lactated ringers IV bolus 1,000 mL  1,000 mL Intravenous Once    Followed by   • lactated ringers infusion  125 mL/hr Intravenous Continuous   • [COMPLETED] acetaminophen (Tylenol Extra Strength) tab 1,000 mg  1,000 mg Oral Once   • ondansetron (Zofran) 4 MG/2ML injection 4 mg  4 mg Intravenous Q6H PRN   • sodium citrate-citric acid (Bicitra) 500-334 MG/5ML oral solution 30 mL  30 mL Oral Once   • oxyTOCIN in sodium chloride 0.9% (Pitocin) 30 Units/500mL infusion premix  62.5-900 norma-units/min Intravenous Continuous   • ceFAZolin (Ancef) 2 g in 20mL IV syringe premix  2 g Intravenous Once   • azithromycin (Zithromax) 500 mg in sodium chloride 0.9% 250mL IVPB premix  500 mg Intravenous Once   • miSOPROStol (Cytotec) 200 MCG tab       • tranexamic acid in sodium chloride 0.7% (Cyklokapron) 1000 mg/100mL infusion premix       • carboprost tromethamine (Hemabate) 250 mcg/mL injection       • methylergonovine (Methergine) 0.2 mg/mL injection       • [COMPLETED] terbutaline (Brethine) 1 MG/ML injection 0.25 mg  0.25 mg Subcutaneous Once       Outpatient Medications:     Medications Prior to Admission   Medication Sig Dispense Refill Last Dose   • magnesium 250 MG Oral Tab Take 1 tablet (250 mg total) by mouth.   2024   • FAMOTIDINE OR Take by mouth.   2024   • Cholecalciferol (VITAMIN D3) 1.25 MG (53898 UT) Oral Cap Take 1 capsule by mouth daily.   2024   • ferrous sulfate 325 (65 FE) MG Oral Tab EC Take 1 tablet (325 mg total) by mouth  daily with breakfast.   2024   • Prenatal Vit-Fe Fum-FA-Omega (PNV PRENATAL PLUS MULTIVIT+DHA) 27-1 & 312 MG Oral Misc Take 1 tablet by mouth daily. 90 each 0 2024       Allergies: Patient has no known allergies.      Anesthesia Evaluation    Patient summary reviewed.    Anesthetic Complications  (-) history of anesthetic complications         GI/Hepatic/Renal    Negative GI/hepatic/renal ROS.                             Cardiovascular  Comment: Echo ():  Conclusions:     1. Left ventricle: The cavity size was normal. Wall thickness was normal.      Systolic function was normal. The estimated ejection fraction was 60-65%.      No regional wall motion abnormalities. Left ventricular diastolic      function parameters were normal.   2. Left atrium: The left atrium was mildly dilated.   3. Right ventricle: The cavity size was normal. Systolic function was      normal.     Impressions:  No previous study was available for comparison.         Exercise tolerance: good                                                Endo/Other               (+) anemia                   Pulmonary    Negative pulmonary ROS.                       Neuro/Psych    Negative neuro/psych ROS.                          IUP with breech presentation, S/F primary C/S.      Past Surgical History:   Procedure Laterality Date   • D & C  3 in , 1 in     Retained plscenta after both babies. Took 3x with baby 1, 1 time with baby 2   • FOOT SURGERY     • HYSTEROSCOPY  2020    possible retained products of conception   •   Aug 2020 ans May 2022    Retained placenta and surgery to repair hematoma on stitch line. 4 D&Cs   • OTHER  2020    Obstetrical Laceration Revision and Evacuation of Hematoma   • OTHER SURGICAL HISTORY      Hymenectomy, wisdom teeth, tear repair post childbirth   • PARTIAL REMOVAL OF HYMEN     • WISDOM TEETH REMOVED       Social History     Socioeconomic History   • Marital status:     Tobacco Use   • Smoking status: Never   • Smokeless tobacco: Never   Vaping Use   • Vaping Use: Never used   Substance and Sexual Activity   • Alcohol use: Not Currently     Alcohol/week: 2.0 standard drinks of alcohol     Types: 1 Glasses of wine, 1 Cans of beer per week     Comment: 1-2 wine/beer weekly   • Drug use: Never   • Sexual activity: Yes     Partners: Male     Birth control/protection: Condom   Other Topics Concern   • Caffeine Concern Yes     Comment: 1 cup of coffee daily and 1 cup of diet coke daily   • Exercise Yes     Comment: 7 x weekly   • Seat Belt Yes     History   Drug Use Unknown     Available pre-op labs reviewed.  Lab Results   Component Value Date    WBC 6.2 01/02/2024    RBC 3.77 (L) 01/02/2024    HGB 12.0 01/02/2024    HCT 34.1 (L) 01/02/2024    MCV 90.5 01/02/2024    MCH 31.8 01/02/2024    MCHC 35.2 01/02/2024    RDW 13.2 01/02/2024    .0 (L) 01/02/2024               Airway      Mallampati: II  Mouth opening: 3 FB  TM distance: 4 - 6 cm  Neck ROM: full Cardiovascular    Cardiovascular exam normal.  Rhythm: regular  Rate: normal  (-) murmur   Dental    Dentition appears grossly intact         Pulmonary    Pulmonary exam normal.  Breath sounds clear to auscultation bilaterally.               Other findings        ASA: 2   Plan: spinal  NPO status verified and patient meets guidelines.    Post-procedure pain management plan discussed with surgeon and patient.  Surgeon requests: regional block    Plan/risks discussed with: patient and spouse  Use of blood product(s) discussed with: patient and spouse    Consented to blood products.      Present on Admission:  **None**

## 2024-01-02 NOTE — OPERATIVE REPORT
[facility]    PATIENT'S NAME: DIANE BRANDT   ATTENDING PHYSICIAN: Nellie Macdonald M.D.   OPERATING PHYSICIAN: Nellie Macdonald M.D.   PATIENT ACCOUNT#:   [id number]    LOCATION:  [address] [place] [address]  MEDICAL RECORD #:   [id number]       YOB: 1995  ADMISSION DATE:       01/02/2024      OPERATION DATE:  01/02/2024    OPERATIVE REPORT      PREOPERATIVE DIAGNOSIS:    1.   Intrauterine pregnancy at 39 weeks.  2.   Breech fetus.  3.   History of anemia in pregnancy.  4.   History of retained placental tissue with 2 prior deliveries and operative hysteroscopy x4 following deliveries.  5.   Family history of heart condition; 's nephew with transposition of great vessels.  Level 2 ultrasound normal.  6.   Ultrasound by Maternal-Fetal Medicine showing possible small area of placenta accreta in the right aspect of the fundus.  MRI showing no strong evidence of placenta accreta.  7.   Panorama with atypical finding on sex hormones; however, amnio was normal.  8.   Varicose vein.  9.   Coccyx pain in the patient.  10.   History of heart symptoms with the patient.  Cardiac workup was negative.  POSTOPERATIVE DIAGNOSIS:    1.   Intrauterine pregnancy at 39 weeks.  2.   Breech fetus.  3.   History of anemia in pregnancy.  4.   History of retained placental tissue with 2 prior deliveries and operative hysteroscopy x4 following deliveries.  5.   Family history of heart condition; 's nephew with transposition of great vessels.  Level 2 ultrasound normal.  6.   Ultrasound by Maternal-Fetal Medicine showing possible small area of placenta accreta in the right aspect of the fundus.  MRI showing no strong evidence of placenta accreta.  7.   Panorama with atypical finding on sex hormones; however, amnio was normal.  8.   Varicose vein.  9.   Coccyx pain in the patient.  10.   History of heart symptoms with the patient.  Cardiac workup was negative.  11.   No evidence of placenta accreta.  PROCEDURE:   Low transverse  section.    ASSISTANT SURGEON:  Fay Silva MD    INDICATIONS:  The patient is a 28-year-old female.  She is a  3, para 2-0-0-2, who is presenting at 39 weeks for scheduled  section.  She has had 2 prior vaginal deliveries and undergone 4 D and C's following the deliveries for retained placental tissue and bleeding postpartum.  She underwent ultrasound with Maternal-Fetal Medicine and there was suspicion of a possible small accreta, and thus she underwent MRI which showed no strong indication of accreta.  I discussed possible accreta with the patient and her , and we discussed options for management if she should hemorrhage and have evidence of accreta.  Risks and benefits of the surgery were discussed with both of them ahead of time, and they verbalized good understanding of the procedure.     OPERATIVE TECHNIQUE:  The patient was brought into the operating room in stable condition.  She was administered a spinal anesthesia and placed in a dorsal supine position.  She was draped and prepped in a sterile fashion.  She received Ancef and Zithromax IV antibiotics.  SCDs were placed, and a Mayberry catheter was placed into her bladder.  A time-out was performed.  Once adequate level of anesthesia was confirmed, a Pfannenstiel incision was made on her lower abdomen and carried down to the level of the fascia and that was extended transversely with Nieves scissors, and then fascia was sharply taken off of the muscle inferiorly and superiorly.  The muscles were opened sharply in the midline, and then a bladder flap was created using sharp dissection.  A low transverse incision was made on the lower uterus and extended bluntly in a low transverse manner.  Fluid was clear.  Of note is that the baby was presenting in a soo breech.  The breech was delivered through the incision to the level of the shoulders and then rotated 180 degrees to deliver anterior and posterior arm and  then head was easily delivered.  Baby was vigorous.  This was a female, 7 pounds 15 ounces, with 9 and 9 Apgars.  Delayed cord clamping of 30 seconds was performed, and baby was handed off to Dr. Manuel.  Following this, the placenta was manually removed to ensure that all of the tissue was removed.  The uterus was explored very carefully.  It was felt that all of the placental tissue was removed.  However, in the right aspect of the uterus there was some brisk bleeding that was noted after removal of the placenta.  Also, this aspect of the uterus from the external aspect appeared thin and felt thinner than the rest of the uterine cavity.  Her adnexa were normal bilaterally.  A Bakri balloon was placed through the uterine incision and out the vagina.  That was infiltrated with 50 mL of fluid and then the incision was closed around the Bakri balloon.  Her bleeding appeared otherwise normal.  Her incision was closed with 0 Monocryl suture in a running locking manner and a second imbricating layer was placed.  Several figure-of-eight sutures were placed with 2-0 chromic on an SH and then 2-0 Vicryl as well and good hemostasis was achieved.  The uterus was replaced into the abdominal cavity, once again reinspected and hemostasis was ensured.  Of note is that there was some oozing around the incision and pieces of Surgicel were placed on the incision and over the bladder dissection area in the left aspect.  At this point, her peritoneum was closed with 2-0 Vicryl.  Following that, the area between the muscle and the fascia was inspected and noted to be dry.  Her fascia was closed with 0 Vicryl in a running manner, subcutaneous was irrigated and brought together with 2-0 plain suture, and her skin was closed with 3-0 Monocryl on a Cruz needle in a subcuticular manner.  Steri-Strips and Mastisol were applied.  Her estimated blood loss was approximately 850 mL.  Her urine output was 30 mL.  She received 2 L of fluid  intraoperatively.  All our sponge, instrument, and needle counts were correct.  Bakri balloon was reevaluated vaginally after surgery and additional 50 mL of fluid were placed into the balloon, so she had a total of 100 mL in the Bakri balloon at the end of the procedure.  That will be kept in at least 12 hours to possibly 24 hours depending on her bleeding.  Mayberry catheter remained in as well.    Dictated By Nellie Macdonald M.D.  d: 01/02/2024 13:03:03  t: 01/02/2024 14:34:11  [facility] [id number]  LL/

## 2024-01-02 NOTE — ANESTHESIA PROCEDURE NOTES
Spinal Block    Date/Time: 1/2/2024 11:21 AM    Performed by: Mookie Li MD  Authorized by: Mookie Li MD      General Information and Staff    Start Time:  1/2/2024 11:21 AM  End Time:  1/2/2024 11:22 AM  Anesthesiologist:  Mookie Li MD  Performed by:  Anesthesiologist  Patient Location:  OB  Site identification: surface landmarks    Reason for Block: at surgeon's request, post-op pain management and surgical anesthesia    Preanesthetic Checklist: patient identified, IV checked, risks and benefits discussed, monitors and equipment checked, pre-op evaluation, timeout performed, anesthesia consent and sterile technique used      Procedure Details    Patient Position:  Sitting  Prep: ChloraPrep    Monitoring:  Cardiac monitor, heart rate and continuous pulse ox  Approach:  Midline  Location:  L3-4  Injection Technique:  Single-shot    Needle    Needle Type:  Sprotte  Needle Gauge:  24 G  Needle Length:  3.5 in    Assessment    Sensory Level:   Events: clear CSF, CSF aspirated, well tolerated and blood negative      Additional Comments

## 2024-01-02 NOTE — PLAN OF CARE
Problem: BIRTH - VAGINAL/ SECTION  Goal: Fetal and maternal status remain reassuring during the birth process  Description: INTERVENTIONS:  - Monitor vital signs  - Monitor fetal heart rate  - Monitor uterine activity  - Monitor labor progression (vaginal delivery)  - DVT prophylaxis (C/S delivery)  - Surgical antibiotic prophylaxis (C/S delivery)  Outcome: Completed     Problem: PAIN - ADULT  Goal: Verbalizes/displays adequate comfort level or patient's stated pain goal  Description: INTERVENTIONS:  - Encourage pt to monitor pain and request assistance  - Assess pain using appropriate pain scale  - Administer analgesics based on type and severity of pain and evaluate response  - Implement non-pharmacological measures as appropriate and evaluate response  - Consider cultural and social influences on pain and pain management  - Manage/alleviate anxiety  - Utilize distraction and/or relaxation techniques  - Monitor for opioid side effects  - Notify MD/LIP if interventions unsuccessful or patient reports new pain  - Anticipate increased pain with activity and pre-medicate as appropriate  Outcome: Completed     Problem: ANXIETY  Goal: Will report anxiety at manageable levels  Description: INTERVENTIONS:  - Administer medication as ordered  - Teach and rehearse alternative coping skills  - Provide emotional support with 1:1 interaction with staff  Outcome: Completed     Problem: Patient/Family Goals  Goal: Patient/Family Long Term Goal  Description: Patient's Long Term Goal: uncomplicated delivery     Interventions:  -   - See additional Care Plan goals for specific interventions  Outcome: Completed  Goal: Patient/Family Short Term Goal  Description: Patient's Short Term Goal: pain control     Interventions:   -   - See additional Care Plan goals for specific interventions  Outcome: Completed

## 2024-01-03 LAB
BASOPHILS # BLD AUTO: 0.03 X10(3) UL (ref 0–0.2)
BASOPHILS NFR BLD AUTO: 0.4 %
BLOOD TYPE BARCODE: 5100
EOSINOPHIL # BLD AUTO: 0.05 X10(3) UL (ref 0–0.7)
EOSINOPHIL NFR BLD AUTO: 0.7 %
ERYTHROCYTE [DISTWIDTH] IN BLOOD BY AUTOMATED COUNT: 13.4 %
HCT VFR BLD AUTO: 27.3 %
HGB BLD-MCNC: 9.2 G/DL
IMM GRANULOCYTES # BLD AUTO: 0.05 X10(3) UL (ref 0–1)
IMM GRANULOCYTES NFR BLD: 0.7 %
LYMPHOCYTES # BLD AUTO: 1.03 X10(3) UL (ref 1–4)
LYMPHOCYTES NFR BLD AUTO: 15.2 %
MCH RBC QN AUTO: 31.9 PG (ref 26–34)
MCHC RBC AUTO-ENTMCNC: 33.7 G/DL (ref 31–37)
MCV RBC AUTO: 94.8 FL
MONOCYTES # BLD AUTO: 0.38 X10(3) UL (ref 0.1–1)
MONOCYTES NFR BLD AUTO: 5.6 %
NEUTROPHILS # BLD AUTO: 5.24 X10 (3) UL (ref 1.5–7.7)
NEUTROPHILS # BLD AUTO: 5.24 X10(3) UL (ref 1.5–7.7)
NEUTROPHILS NFR BLD AUTO: 77.4 %
PLATELET # BLD AUTO: 107 10(3)UL (ref 150–450)
RBC # BLD AUTO: 2.88 X10(6)UL
UNIT VOLUME: 350 ML
WBC # BLD AUTO: 6.8 X10(3) UL (ref 4–11)

## 2024-01-03 PROCEDURE — 85025 COMPLETE CBC W/AUTO DIFF WBC: CPT | Performed by: OBSTETRICS & GYNECOLOGY

## 2024-01-03 NOTE — PROGRESS NOTES
[facility]  Post-Partum Caesarean Section Progress Note    Gloria Vasquez Patient Status:  Inpatient    3/16/1995 MRN [id number]   Location [facility] 2SW-J Attending Nellie Macdonald MD, MD   Hosp Day # 1 PCP Florecita Novoa DO     SUBJECTIVE:    Postpartum Day 1:  Delivery    The patient feels well. Pain is well controlled with current medications. Lochia is appropriate. Urinary output is adequate.  The patient is tolerating a  diet. Flatus has been passed.     Bakri balloon was removed without complication with less than 100mL of blood in the bag. Urinary aranda catheter was removed after the Bakri balloon. Patient tolerated well.     OBJECTIVE:    Vital signs in last 24 hours:  Temp:  [98.2 °F (36.8 °C)-98.4 °F (36.9 °C)] 98.4 °F (36.9 °C)  Pulse:  [54-79] 77  Resp:  [9-20] 18  BP: ()/(49-87) 127/69  SpO2:  [96 %-100 %] 100 %    Input/Output:    Intake/Output Summary (Last 24 hours) at 1/3/2024 1027  Last data filed at 1/3/2024 0939  Gross per 24 hour   Intake 3616.33 ml   Output 3620 ml   Net -3.67 ml         General:    alert, appears stated age, and cooperative   Incision:  Dressing in place and clean   DVT Evaluation:  No evidence of DVT seen on physical exam.     Data Reviewed:  Recent Labs   Lab 24  0853 24  0905   RBC 3.77* 2.88*   HGB 12.0 9.2*   HCT 34.1* 27.3*   MCV 90.5 94.8   MCH 31.8 31.9   MCHC 35.2 33.7   RDW 13.2 13.4   NEPRELIM 3.97 5.24   WBC 6.2 6.8   .0* 107.0*           ASSESSMENT/PLAN:    Status post  section. Doing well postoperatively.   Aranda catheter removed, will await spontaneous void  Bakri balloon removed and lochia appropriate  Venofer 200mg IV x1 dose ordered   Ok to remove IV after Venofer if bleeding stable at 24 hour postop  Continue current care.    Fay Silva MD  1/3/2024  10:26 AM

## 2024-01-03 NOTE — PROGRESS NOTES
Received in mother/baby in stable condition in room 5391. Id bands verified. Hugs and kisses verified. Oriented to room. Call light in reach. Side rails up x 2. Bed in low position.

## 2024-01-03 NOTE — PLAN OF CARE

## 2024-01-03 NOTE — PROGRESS NOTES
Pt is transferred to MB unit in stable condition via wheelchair accompanied by spouse and infant. Report given to PAULINA Whitlock

## 2024-01-04 VITALS
WEIGHT: 206 LBS | TEMPERATURE: 99 F | RESPIRATION RATE: 16 BRPM | DIASTOLIC BLOOD PRESSURE: 63 MMHG | SYSTOLIC BLOOD PRESSURE: 116 MMHG | OXYGEN SATURATION: 100 % | HEIGHT: 70 IN | BODY MASS INDEX: 29.49 KG/M2 | HEART RATE: 69 BPM

## 2024-01-04 LAB
BLOOD TYPE BARCODE: 5100
T PALLIDUM AB SER QL IA: NONREACTIVE
UNIT VOLUME: 350 ML

## 2024-01-04 RX ORDER — IBUPROFEN 600 MG/1
600 TABLET ORAL EVERY 6 HOURS PRN
Qty: 30 TABLET | Refills: 1 | Status: SHIPPED | OUTPATIENT
Start: 2024-01-04

## 2024-01-04 RX ORDER — GABAPENTIN 300 MG/1
300 CAPSULE ORAL EVERY 8 HOURS PRN
Qty: 30 CAPSULE | Refills: 1 | Status: SHIPPED | OUTPATIENT
Start: 2024-01-04

## 2024-01-04 NOTE — PROGRESS NOTES
POD2    S: feeling ok, passing flatus, pain is managed with po meds, she is breastfeeding, lochia is minimal  No emotional concerns, she notes right sided incisional pain  O: /63 (BP Location: Left arm)   Pulse 69   Temp 98.5 °F (36.9 °C) (Oral)   Resp 16   Ht 5' 10\" (1.778 m)   Wt 206 lb (93.4 kg)   LMP 03/07/2023 (Exact Date)   SpO2 100%   Breastfeeding Yes   BMI 29.56 kg/m²   Recent Labs   Lab 01/02/24  0853 01/03/24  0905   RBC 3.77* 2.88*   HGB 12.0 9.2*   HCT 34.1* 27.3*   MCV 90.5 94.8   MCH 31.8 31.9   MCHC 35.2 33.7   RDW 13.2 13.4   NEPRELIM 3.97 5.24   WBC 6.2 6.8   .0* 107.0*       General: alert and oriented  Respiratory: non labored breathing  Abdomen: soft, minimal tenderness  Wound: dry and intact  Ext: minimal edema, no evidence of dvt      A:s/p ltcs for breech     Anemia     Stable at this time     History of postpartum bleeding and resection with hysteroscopy for retained tissue  P: discharge instructions reviewed and expectations       She will go home today

## 2024-01-04 NOTE — DISCHARGE SUMMARY
[facility]  Discharge Summary    Gloria Vasquez Patient Status:  Inpatient    3/16/1995 MRN [id number]   Location [facility] 2SW-J Attending Nellie Macdonald MD, MD   Hosp Day # 2 PCP Florecita Novoa DO     Date of Admission: 2024    Date of Discharge: 24      Admitting Diagnosis: pregnancy  Pregnancy    Discharge Diagnosis:   Patient Active Problem List   Diagnosis    Rh negative status during pregnancy    Pregnancy       Reason for Admission: IUP at term with breech fetus, status post attempted ecv            Hospital Course: 29 y/o  who presented for a scheduled  section for breech fetus. She had an LTCS and delivered a female infant. She did have a bakri baloon placed due to some brisk bleeding from the placental site and history of piror retained placental tissue. She had the balloon remvoed the following morning and did well. She was anemic with a hgb of 9.2 and got one dose of venofer. She went home with the baby on POD2 and instructed on activity.        Procedures: low transverse  section with placement and removal of bakri balloon    Complications: none    Disposition: Home or Self Care    Discharge Condition: Stable    Discharge Medications:   Current Discharge Medication List        START taking these medications    Details   gabapentin 300 MG Oral Cap Take 1 capsule (300 mg total) by mouth every 8 (eight) hours as needed (For breakthrough moderate pain).  Qty: 30 capsule, Refills: 1      ibuprofen 600 MG Oral Tab Take 1 tablet (600 mg total) by mouth every 6 (six) hours as needed for Pain.  Qty: 30 tablet, Refills: 1           CONTINUE these medications which have NOT CHANGED    Details   magnesium 250 MG Oral Tab Take 1 tablet (250 mg total) by mouth.      Cholecalciferol (VITAMIN D3) 1.25 MG (55715 UT) Oral Cap Take 1 capsule by mouth daily.      ferrous sulfate 325 (65 FE) MG Oral Tab EC Take 1 tablet (325 mg total) by mouth daily with breakfast.       Prenatal Vit-Fe Fum-FA-Omega (PNV PRENATAL PLUS MULTIVIT+DHA) 27-1 & 312 MG Oral Misc Take 1 tablet by mouth daily.  Qty: 90 each, Refills: 0           STOP taking these medications       FAMOTIDINE OR            Diet: general  Activity: no heavy lifting                Nellie Macdonald MD  1/4/2024  11:10 AM

## 2024-01-04 NOTE — PLAN OF CARE

## 2024-01-07 ENCOUNTER — TELEPHONE (OUTPATIENT)
Dept: OBGYN UNIT | Facility: HOSPITAL | Age: 29
End: 2024-01-07

## 2024-01-07 NOTE — PROGRESS NOTES
Cradle call completed. Mom and baby care reviewed. All questions answered. Baby has pediatrician appointment tomorrow. Cradle call letters sent via [employer].

## 2024-04-03 ENCOUNTER — OFFICE VISIT (OUTPATIENT)
Dept: FAMILY MEDICINE CLINIC | Facility: CLINIC | Age: 29
End: 2024-04-03
Payer: COMMERCIAL

## 2024-04-03 VITALS
OXYGEN SATURATION: 98 % | HEIGHT: 69.37 IN | WEIGHT: 190 LBS | TEMPERATURE: 98 F | RESPIRATION RATE: 16 BRPM | HEART RATE: 84 BPM | BODY MASS INDEX: 27.82 KG/M2 | SYSTOLIC BLOOD PRESSURE: 104 MMHG | DIASTOLIC BLOOD PRESSURE: 62 MMHG

## 2024-04-03 DIAGNOSIS — Z00.00 ROUTINE GENERAL MEDICAL EXAMINATION AT A HEALTH CARE FACILITY: Primary | ICD-10-CM

## 2024-04-03 DIAGNOSIS — F45.8 BRUXISM: ICD-10-CM

## 2024-04-03 DIAGNOSIS — M53.3 COCCYDYNIA: ICD-10-CM

## 2024-04-03 DIAGNOSIS — M26.623 BILATERAL TEMPOROMANDIBULAR JOINT PAIN: ICD-10-CM

## 2024-04-03 PROBLEM — Z34.90 PREGNANCY (HCC): Status: RESOLVED | Noted: 2024-01-02 | Resolved: 2024-04-03

## 2024-04-03 PROCEDURE — 3074F SYST BP LT 130 MM HG: CPT | Performed by: FAMILY MEDICINE

## 2024-04-03 PROCEDURE — 3078F DIAST BP <80 MM HG: CPT | Performed by: FAMILY MEDICINE

## 2024-04-03 PROCEDURE — 99395 PREV VISIT EST AGE 18-39: CPT | Performed by: FAMILY MEDICINE

## 2024-04-03 PROCEDURE — 3008F BODY MASS INDEX DOCD: CPT | Performed by: FAMILY MEDICINE

## 2024-04-03 RX ORDER — ACETAMINOPHEN AND CODEINE PHOSPHATE 120; 12 MG/5ML; MG/5ML
0.35 SOLUTION ORAL DAILY
COMMUNITY
Start: 2024-02-13

## 2024-04-03 NOTE — PROGRESS NOTES
Chief Complaint   Patient presents with    Physical         HPI:   Gloria Vasquez is a 29 year old female       Has been back to working out for the last 6 weeks.    She is 12 weeks postpartum and is breast feeding.    TMJ, has bruxism, has tried multiple therapies.    Tail bone pain.  Fell down stairs about 2 years ago.  Worsened during pregnancy.  Worsening with exercise.  Working with ortho at [facility].      Last PAP: UTD  Abnormal PAP: denies h/o abnl pap          Wt Readings from Last 6 Encounters:   04/03/24 190 lb (86.2 kg)   01/02/24 206 lb (93.4 kg)   11/27/23 203 lb (92.1 kg)   10/16/23 196 lb (88.9 kg)   08/21/23 186 lb (84.4 kg)   07/31/23 182 lb (82.6 kg)     Body mass index is 27.76 kg/m².       Patient Active Problem List   Diagnosis    Rh negative status during pregnancy ([facility])    Coccydynia    Bruxism    Bilateral temporomandibular joint pain     Current Outpatient Medications   Medication Sig Dispense Refill    Linoleic Acid-Sunflower Oil 500-1000 MG Oral Cap Take 2,400 mg by mouth in the morning and 2,400 mg before bedtime.      Norethindrone 0.35 MG Oral Tab Take 1 tablet (0.35 mg total) by mouth daily.      magnesium 250 MG Oral Tab Take 1 tablet (250 mg total) by mouth.      Cholecalciferol (VITAMIN D3) 1.25 MG (50803 UT) Oral Cap Take 1 capsule (50,000 Units total) by mouth daily.      ferrous sulfate 325 (65 FE) MG Oral Tab EC Take 1 tablet (325 mg total) by mouth daily with breakfast.      Prenatal Vit-Fe Fum-FA-Omega (PNV PRENATAL PLUS MULTIVIT+DHA) 27-1 & 312 MG Oral Misc Take 1 tablet by mouth daily. 90 each 0      Past Medical History:   Diagnosis Date    Anemia 2015    Pneumonia 2019    Retained portions of placenta or amniotic membrane after delivery (Formerly [facility]) 07/27/2022    Formatting of this note might be different from the original. Added automatically from request for surgery [id number]    Retained products of conception after delivery with complications ([facility]) 03/18/2021    Formatting  of this note might be different from the original. Added automatically from request for surgery [id number]    Retained products of conception after delivery without hemorrhage (HCC)     Rh negative status during pregnancy (HCC) 2020    Repeat antibody screen at 27-28 wks Rhogam IM within 6 days of labs       Past Surgical History:   Procedure Laterality Date      2024    Dr Macdonald    D & C  3 in , 1 in     Retained plscenta after both babies. Took 3x with baby 1, 1 time with baby 2    FOOT SURGERY  2012    HYSTEROSCOPY  2020    possible retained products of conception    HYSTEROSCOPY  2024    Dr Pozo      Aug 2020 ans May 2022    Retained placenta and surgery to repair hematoma on stitch line. 4 D&Cs    OTHER  2020    Obstetrical Laceration Revision and Evacuation of Hematoma    OTHER SURGICAL HISTORY      Hymenectomy, wisdom teeth, tear repair post childbirth    PARTIAL REMOVAL OF HYMEN      WISDOM TEETH REMOVED        Family History   Problem Relation Age of Onset    Heart Disorder Mother         Mom, grandma, great grandla maternal uncles all with sudden electrical heart problems- 4 resulted in death. Theyve done studies on my family but dont know whats happening. I see a cardiologist.    Cancer Mother         Skin    Anemia Mother     Other (Electrical Herat Issues) Mother     Prostate Cancer Father     Heart Disease Maternal Grandmother         passed away suddenly when heart stopped per patient    Other (Electrical Herat Issues) Maternal Grandmother     Other (Electrical Herat Issues) Paternal Grandmother     Other (Lung Cancer) Paternal Grandfather     Other (Electrical Herat Issues) Maternal Uncle     Other (Electrical Herat Issues) Maternal Uncle     Skin cancer Other       Social History:   Social History     Socioeconomic History    Marital status:    Tobacco Use    Smoking status: Never    Smokeless tobacco: Never   Vaping Use    Vaping  Use: Never used   Substance and Sexual Activity    Alcohol use: Yes     Alcohol/week: 2.0 standard drinks of alcohol     Types: 1 Glasses of wine, 1 Cans of beer per week     Comment: 1-2 wine/beer weekly    Drug use: Never    Sexual activity: Yes     Partners: Male     Birth control/protection: Condom   Other Topics Concern    Caffeine Concern Yes     Comment: 1 cup of coffee daily and 1 cup of diet coke daily    Exercise Yes     Comment: 7 x weekly    Seat Belt Yes     Social Determinants of Health     Financial Resource Strain: Low Risk  (1/2/2024)    Financial Resource Strain     Difficulty of Paying Living Expenses: Not hard at all     Med Affordability: No   Food Insecurity: No Food Insecurity (1/2/2024)    Food Insecurity     Food Insecurity: Never true   Transportation Needs: No Transportation Needs (1/2/2024)    Transportation Needs     Lack of Transportation: No   Stress: No Stress Concern Present (1/2/2024)    Stress     Feeling of Stress : No   Housing Stability: Low Risk  (1/2/2024)    Housing Stability     Housing Instability: No     Occ: homemaker. Marital Status: . Children: 3.   Exercise:  strength training 5-6 times per week .    Diet:  eats healthfully     REVIEW OF SYSTEMS:   GENERAL: Overall feels well  SKIN: denies any unusual skin lesions or rashes  EYES: denies vision changes  HEENT: denies upper respiratory symptoms  LUNGS: denies cough or shortness of breath with exertion  CHEST:  denies breast changes or pain  CARDIOVASCULAR: denies chest pain or tightness on exertion  VASCULAR: No lower extremity swelling  GI: denies abdominal pain, bowel movement changes, blood in stool  : No complaint of urinary problems, vaginal discharge or discomfort  MUSCULOSKELETAL: As in HPI  NEURO: denies headaches or dizziness  PSYCH: denies depression or anxiety  ENDOCRINE: No complaints of temperature intolerance, polyuria, or excessive sweating.  LYMPHATICS: No complaints of swollen glands      EXAM:    /62   Pulse 84   Temp 98 °F (36.7 °C) (Temporal)   Resp 16   Ht 5' 9.37\" (1.762 m)   Wt 190 lb (86.2 kg)   LMP 03/07/2023 (Exact Date)   SpO2 98%   BMI 27.76 kg/m²   Body mass index is 27.76 kg/m².   GENERAL: NAD, Pleasant  female.  SKIN: No visible rashes or suspicious lesions.  HEENT: atraumatic, normocephalic, EACs and TMs clear normal bilaterally.  Nose: No nasal discharge.  OP: MMM.  Posteriorly no exudate or erythema.  EYES: PERRL, EOMI, sclera, conjunctiva are clear  NECK: supple, no adenopathy/thyromegaly/masses  LUNGS: Clear to auscultation bilateral, no rales, rhonchi or wheezing  CARDIO: RRR without murmur normal S1S2  ABD: Flat. Soft, non tender to palpation.  No masses, HSM, or pulsations appreciated.  MUSCULOSKELETAL: gait normal, no gross M/S defect.  EXTREMITIES: no clubbing, cyanosis, or edema  NEURO: Alert and oriented x3.  No gross motor or sensory deficit.  PSYCH: normal affect      Immunization History   Administered Date(s) Administered    Covid-19 Vaccine Moderna 50 Mcg/0.25 Ml 12/04/2021    Covid-19 Vaccine Moderna Bivalent 50mcg/0.5mL 12+ years 09/21/2022    Covid-19 Vaccine Pfizer 30 mcg/0.3 ml 04/02/2021, 04/24/2021    DTAP INFANRIX 03/02/1999    DTP 05/18/1994, 07/27/1994, 11/10/1994    DTP/HIB Combined 03/21/1995    FLUZONE 6 months and older PFS 0.5 ml (80250) 09/24/2016, 08/12/2018    Fluarix 6 Months And Older 0.5 ml prefilled syringe (25560) 09/11/2019    Flucelvax 0.5 Ml Quad PFS Single Dose 10/16/2020    Flulaval, 3 Years & >, IM 10/16/2020, 10/27/2022    Hep B, Unspecified Formulation 03/17/1994, 04/14/1994, 07/27/1994    Hib, Unspecified Formulation 05/18/1994, 07/27/1994, 11/10/1994    Influenza 05/18/1994, 07/27/1994, 11/10/1994    MMR 03/21/1995, 03/02/1999    OPV 05/18/1994, 07/27/1994, 11/10/1994, 03/21/1995, 03/02/1999    RHO(D) Immune Globulin 06/03/2020    TDAP 08/12/2018, 06/03/2020    Varicella 03/15/1996       DATA:    Last 12 months of labs in  care everywhere read and reviewed.  Last PAP also located in care everywhere.      ASSESSMENT AND PLAN:   Gloria Vasquez is a 29 year old female who presents for a complete physical exam.        Encounter Diagnoses   Name Primary?    Routine general medical examination at a health care facility Yes    Coccydynia     Bruxism     Bilateral temporomandibular joint pain          1. Routine general medical examination at a health care facility  Patient provided handout on women's health and prevention.   Routine health profile labs pending.  Recommend healthy diet including green leafy vegetables, fresh fruits and lean protein.  Aerobic exercise 30 minutes five days a week for cardiovascular fitness and 45-60 minutes 6-7 days a week for weight loss.       - Comp Metabolic Panel (14); Future  - Lipid Panel; Future  - Assay, Thyroid Stim Hormone; Future  - Free T4, (Free Thyroxine); Future    2. Coccydynia  Patient under care of of orthopedic specialist through [facility].    3. Bruxism  4. Bilateral temporomandibular joint pain  Patient followed by dentist, ENT, and also has appointment at a TMJ clinic coming up.          Orders Placed This Encounter   Procedures    Comp Metabolic Panel (14)    Lipid Panel    Assay, Thyroid Stim Hormone    Free T4, (Free Thyroxine)       Return in about 1 year (around 4/3/2025) for Annual wellness visit.  Sooner if needed..

## 2024-06-10 DIAGNOSIS — M53.3 COCCYODYNIA: Primary | ICD-10-CM

## 2024-06-10 DIAGNOSIS — M79.18 MYOFASCIAL PAIN DYSFUNCTION SYNDROME: ICD-10-CM

## 2024-06-25 ENCOUNTER — APPOINTMENT (OUTPATIENT)
Dept: PHYSICAL THERAPY | Age: 29
End: 2024-06-25

## 2024-06-25 DIAGNOSIS — M62.89 MUSCLE TIGHTNESS: Primary | ICD-10-CM

## 2024-06-25 DIAGNOSIS — M53.3 COCCYODYNIA: ICD-10-CM

## 2024-06-25 DIAGNOSIS — M79.18 MYOFASCIAL PAIN DYSFUNCTION SYNDROME: ICD-10-CM

## 2024-06-25 PROCEDURE — 97110 THERAPEUTIC EXERCISES: CPT | Performed by: PHYSICAL THERAPIST

## 2024-06-25 PROCEDURE — 97161 PT EVAL LOW COMPLEX 20 MIN: CPT | Performed by: PHYSICAL THERAPIST

## 2024-06-25 PROCEDURE — 97140 MANUAL THERAPY 1/> REGIONS: CPT | Performed by: PHYSICAL THERAPIST

## 2024-06-25 ASSESSMENT — ENCOUNTER SYMPTOMS
PAIN FREQUENCY: INTERMITTENT
QUALITY: SHARP
SUBJECTIVE PAIN PROGRESSION: NO CHANGE
QUALITY: DISCOMFORT
QUALITY: ACHE
PAIN SCALE AT HIGHEST: 8
QUALITY: PRESSURE
ALLEVIATING FACTORS: CHANGE IN POSITION

## 2024-07-03 ENCOUNTER — APPOINTMENT (OUTPATIENT)
Dept: PHYSICAL THERAPY | Age: 29
End: 2024-07-03

## 2024-07-03 DIAGNOSIS — M79.18 MYOFASCIAL PAIN DYSFUNCTION SYNDROME: ICD-10-CM

## 2024-07-03 DIAGNOSIS — M62.89 MUSCLE TIGHTNESS: Primary | ICD-10-CM

## 2024-07-03 DIAGNOSIS — M53.3 COCCYODYNIA: ICD-10-CM

## 2024-07-11 ENCOUNTER — APPOINTMENT (OUTPATIENT)
Dept: PHYSICAL THERAPY | Age: 29
End: 2024-07-11

## 2024-07-11 DIAGNOSIS — M79.18 MYOFASCIAL PAIN DYSFUNCTION SYNDROME: ICD-10-CM

## 2024-07-11 DIAGNOSIS — M53.3 COCCYODYNIA: ICD-10-CM

## 2024-07-11 DIAGNOSIS — M62.89 MUSCLE TIGHTNESS: Primary | ICD-10-CM

## 2024-07-12 ENCOUNTER — APPOINTMENT (OUTPATIENT)
Dept: PHYSICAL THERAPY | Age: 29
End: 2024-07-12

## 2024-07-17 ENCOUNTER — APPOINTMENT (OUTPATIENT)
Dept: PHYSICAL THERAPY | Age: 29
End: 2024-07-17

## 2024-07-17 DIAGNOSIS — M62.89 MUSCLE TIGHTNESS: Primary | ICD-10-CM

## 2024-07-17 DIAGNOSIS — M53.3 COCCYODYNIA: ICD-10-CM

## 2024-07-17 DIAGNOSIS — M79.18 MYOFASCIAL PAIN DYSFUNCTION SYNDROME: ICD-10-CM

## 2024-07-30 ENCOUNTER — APPOINTMENT (OUTPATIENT)
Dept: PHYSICAL THERAPY | Age: 29
End: 2024-07-30

## 2024-07-30 DIAGNOSIS — M62.89 MUSCLE TIGHTNESS: Primary | ICD-10-CM

## 2024-07-30 DIAGNOSIS — M53.3 COCCYODYNIA: ICD-10-CM

## 2024-07-30 DIAGNOSIS — M79.18 MYOFASCIAL PAIN DYSFUNCTION SYNDROME: ICD-10-CM

## 2024-07-30 PROCEDURE — 97112 NEUROMUSCULAR REEDUCATION: CPT | Performed by: PHYSICAL THERAPIST

## 2024-07-30 PROCEDURE — 97140 MANUAL THERAPY 1/> REGIONS: CPT | Performed by: PHYSICAL THERAPIST

## 2024-08-07 ENCOUNTER — APPOINTMENT (OUTPATIENT)
Dept: PHYSICAL THERAPY | Age: 29
End: 2024-08-07

## 2024-08-07 DIAGNOSIS — M62.89 MUSCLE TIGHTNESS: Primary | ICD-10-CM

## 2024-08-07 DIAGNOSIS — M79.18 MYOFASCIAL PAIN DYSFUNCTION SYNDROME: ICD-10-CM

## 2024-08-07 DIAGNOSIS — M53.3 COCCYODYNIA: ICD-10-CM

## 2024-08-07 PROCEDURE — 97110 THERAPEUTIC EXERCISES: CPT | Performed by: PHYSICAL THERAPIST

## 2024-08-07 PROCEDURE — 97112 NEUROMUSCULAR REEDUCATION: CPT | Performed by: PHYSICAL THERAPIST

## 2024-08-07 PROCEDURE — 97140 MANUAL THERAPY 1/> REGIONS: CPT | Performed by: PHYSICAL THERAPIST

## 2024-08-22 ENCOUNTER — APPOINTMENT (OUTPATIENT)
Dept: PHYSICAL THERAPY | Age: 29
End: 2024-08-22

## 2024-09-30 ENCOUNTER — TELEPHONE (OUTPATIENT)
Dept: ORTHOPEDICS CLINIC | Facility: CLINIC | Age: 29
End: 2024-09-30

## 2024-09-30 DIAGNOSIS — M25.512 LEFT SHOULDER PAIN, UNSPECIFIED CHRONICITY: Primary | ICD-10-CM

## 2024-09-30 NOTE — TELEPHONE ENCOUNTER
Patient scheduled for left shoulder blade pain. Please advise for imaging     Future Appointments   Date Time Provider Department Center   10/1/2024  7:00 AM Medina Snow PA [email or web address]   10/4/2024  9:30 AM Chelsea Moe MD [facility]   10/11/2024  8:10 AM [email or web address]   4/10/2025  8:00 AM Florecita Novoa DO [facility] [place]

## 2024-10-01 ENCOUNTER — HOSPITAL ENCOUNTER (OUTPATIENT)
Dept: GENERAL RADIOLOGY | Age: 29
Discharge: HOME | End: 2024-10-01
Attending: PHYSICIAN ASSISTANT
Payer: COMMERCIAL

## 2024-10-01 ENCOUNTER — PATIENT MESSAGE (OUTPATIENT)
Dept: ORTHOPEDICS CLINIC | Facility: CLINIC | Age: 29
End: 2024-10-01

## 2024-10-01 ENCOUNTER — OFFICE VISIT (OUTPATIENT)
Dept: ORTHOPEDICS CLINIC | Facility: CLINIC | Age: 29
End: 2024-10-01
Payer: COMMERCIAL

## 2024-10-01 VITALS — BODY MASS INDEX: 27.82 KG/M2 | HEIGHT: 69.37 IN | WEIGHT: 190 LBS

## 2024-10-01 DIAGNOSIS — G89.29 CHRONIC SCAPULAR PAIN: Primary | ICD-10-CM

## 2024-10-01 DIAGNOSIS — M79.89 SOFT TISSUE MASS: ICD-10-CM

## 2024-10-01 DIAGNOSIS — G89.29 CHRONIC SCAPULAR PAIN: ICD-10-CM

## 2024-10-01 DIAGNOSIS — M25.512 LEFT SHOULDER PAIN, UNSPECIFIED CHRONICITY: ICD-10-CM

## 2024-10-01 DIAGNOSIS — M89.8X1 CHRONIC SCAPULAR PAIN: Primary | ICD-10-CM

## 2024-10-01 DIAGNOSIS — M79.89 SOFT TISSUE MASS: Primary | ICD-10-CM

## 2024-10-01 DIAGNOSIS — M89.8X1 CHRONIC SCAPULAR PAIN: ICD-10-CM

## 2024-10-01 PROCEDURE — 99214 OFFICE O/P EST MOD 30 MIN: CPT | Performed by: PHYSICIAN ASSISTANT

## 2024-10-01 PROCEDURE — 3008F BODY MASS INDEX DOCD: CPT | Performed by: PHYSICIAN ASSISTANT

## 2024-10-01 PROCEDURE — 73030 X-RAY EXAM OF SHOULDER: CPT | Performed by: PHYSICIAN ASSISTANT

## 2024-10-01 RX ORDER — MELOXICAM 15 MG/1
15 TABLET ORAL DAILY
Qty: 14 TABLET | Refills: 1 | Status: SHIPPED | OUTPATIENT
Start: 2024-10-01

## 2024-10-01 NOTE — TELEPHONE ENCOUNTER
From: Gloria Vasquez  To: Sinceprakash Snow  Sent: 10/1/2024 12:58 PM CDT  Subject: Follow up    Hi Sincer. I wasn’t able to get a MRI for another 2 1/2 weeks. I went ahead and scheduled, but am I able to just get a script from you and go somewhere else and then give a disc to you? Do you ever have people do that to speed this up, and do you have any recommendations of where I could go?

## 2024-10-01 NOTE — H&P
[facility] - ORTHOPEDICS  [address] [place] [id number]  [phone]     NEW PATIENT VISIT - HISTORY AND PHYSICAL EXAMINATION     Name: Gloria Vasquze   MRN: [id number]  Date: 10/1/2024     CC: Left shoulder pain.    REFERRED BY: Florecita Novoa DO    HPI:   Gloria Vasquez is a very pleasant 29 year old right-hand dominant female who presents today for evaluation, consultation, and management of left shoulder pain worsening for the last 4 months.  She has worked with doctors of physical therapy and has completed 2 rounds of physical therapy with worsening of symptoms.  She describes a ball/soft tissue mass at her scapula. She describes a stabbing burning sensation. No tingling or numbness.  She rates her pain to be a 6-7 out of 10 and notes less than 50% normal function.  She is very active as a mom with 3 kids and exercises on a routine basis.    PMH:   Past Medical History:    Anemia    Pneumonia    Retained portions of placenta or amniotic membrane after delivery (HCC)    Formatting of this note might be different from the original. Added automatically from request for surgery [id number]    Retained products of conception after delivery with complications ([facility])    Formatting of this note might be different from the original. Added automatically from request for surgery [id number]    Retained products of conception after delivery without hemorrhage ([facility])    Rh negative status during pregnancy ([facility])    Repeat antibody screen at 27-28 wks Rhogam IM within 6 days of labs        PAST SURGICAL HX:  Past Surgical History:   Procedure Laterality Date      2024    Dr Macdonald    D & c  3 in , 1 in     Retained plscenta after both babies. Took 3x with baby 1, 1 time with baby 2    Foot surgery  2012    Hysteroscopy  2020    possible retained products of conception    Hysteroscopy  2024    Dr Pozo      Aug 2020 ans May 2022    Retained placenta and  surgery to repair hematoma on stitch line. 4 D&Cs    Other  08/26/2020    Obstetrical Laceration Revision and Evacuation of Hematoma    Other surgical history  2022    Hymenectomy, wisdom teeth, tear repair post childbirth    Partial removal of hymen  2007    [place] teeth removed  2012       FAMILY HX:  Family History   Problem Relation Age of Onset    Heart Disorder Mother         Mom, grandma, great grandla maternal uncles all with sudden electrical heart problems- 4 resulted in death. Theyve done studies on my family but dont know whats happening. I see a cardiologist.    Cancer Mother         Skin    Anemia Mother     Other (Electrical Herat Issues) Mother     Prostate Cancer Father     Heart Disease Maternal Grandmother         passed away suddenly when heart stopped per patient    Other (Electrical Herat Issues) Maternal Grandmother     Other (Electrical Herat Issues) Paternal Grandmother     Other (Lung Cancer) Paternal Grandfather     Other (Electrical Herat Issues) Maternal Uncle     Other (Electrical Herat Issues) Maternal Uncle     Skin cancer Other        ALLERGIES:  Patient has no known allergies.    MEDICATIONS:   Current Outpatient Medications   Medication Sig Dispense Refill    Linoleic Acid-Sunflower Oil 500-1000 MG Oral Cap Take 2,400 mg by mouth in the morning and 2,400 mg before bedtime.      Norethindrone 0.35 MG Oral Tab Take 1 tablet (0.35 mg total) by mouth daily.      magnesium 250 MG Oral Tab Take 1 tablet (250 mg total) by mouth.      Cholecalciferol (VITAMIN D3) 1.25 MG (16875 UT) Oral Cap Take 1 capsule (50,000 Units total) by mouth daily.      ferrous sulfate 325 (65 FE) MG Oral Tab EC Take 1 tablet (325 mg total) by mouth daily with breakfast.      Prenatal Vit-Fe Fum-FA-Omega (PNV PRENATAL PLUS MULTIVIT+DHA) 27-1 & 312 MG Oral Misc Take 1 tablet by mouth daily. 90 each 0       ROS: A comprehensive 14 point review of systems was performed and was negative aside from the aforementioned  per history of present illness.    SOCIAL HX:  Social History     Occupational History    Not on file   Tobacco Use    Smoking status: Never    Smokeless tobacco: Never   Vaping Use    Vaping status: Never Used   Substance and Sexual Activity    Alcohol use: Yes     Alcohol/week: 2.0 standard drinks of alcohol     Types: 1 Glasses of wine, 1 Cans of beer per week     Comment: 1-2 wine/beer weekly    Drug use: Never    Sexual activity: Yes     Partners: Male     Birth control/protection: Condom        PE:   Vitals:    10/01/24 0710   Weight: 190 lb (86.2 kg)   Height: 5' 9.37\" (1.762 m)     Estimated body mass index is 27.76 kg/m² as calculated from the following:    Height as of this encounter: 5' 9.37\" (1.762 m).    Weight as of this encounter: 190 lb (86.2 kg).    Physical Exam  Constitutional:       Appearance: Normal appearance.   HENT:      Head: Normocephalic and atraumatic.   Eyes:      Extraocular Movements: Extraocular movements intact.   Neck:      Musculoskeletal: Normal range of motion and neck supple.   Cardiovascular:      Pulses: Normal pulses.   Pulmonary:      Effort: Pulmonary effort is normal. No respiratory distress.   Abdominal:      General: There is no distension.   Skin:     General: Skin is warm.      Capillary Refill: Capillary refill takes less than 2 seconds.      Findings: No bruising.   Neurological:      General: No focal deficit present.      Mental Status: Alert.   Psychiatric:         Mood and Affect: Mood normal.     Examination of the left shoulder demonstrates:     Skin is intact, warm and dry.   Cervical:  Full ROM  Spurling's  Negative    Deformity:   none  Atrophy:   none    Scapular winging: Negative     Palpation:     AC Joint  Negative  Biceps Tendon  Negative  Greater Tuberosity Negative   3+ posterior scapular pain     ROM:   Forward Flexion:  full and symmetric  Abduction:   full and symmetric  External Rotation:  full and symmetric  Internal Rotation:  full and  symmetric    Rotator Cuff Strength:   Supraspinatus:   5/5  Subscapularis:   5/5  Infraspinatus/Teres: 5/5    Provocative Tests:   Morgan:   Negative  Speed's:   Negative  Pendleton's:   Negative  Lift-off:   Negative  Apprehension:  Negative  Sulcus Sign:   Negative    Neurovascular Upper Extremity (Bilateral)  Motor:    5/5 EPL, Finger Abduction, , Pinch, Deltoid  Sensation:   intact to light touch median, ulnar, radial and axillary nerve  Circulation:   Normal, 2+ radial pulse    The contralateral upper extremity is without limitation in range of motion or strength, no positive provocative maneuvers.     Radiographic Examination/Diagnostics:    I personally viewed, independently interpreted and radiology report was reviewed.    X-ray left Shoulder, 10/1/2024- no evidence of fracture, foreign body or soft tissue injury.     IMPRESSION: Gloria Vasquez is a 29 year old Right hand dominant female with left scapular pain requiring advanced imaging after failing conservative treatment to evaluate soft tissue mass.     PLAN:   We had a detailed discussion outlining the etiology, anatomy, pathophysiology, and natural history of the patient's findings. Imaging was reviewed in detail and correlated to a 3-dimensional model of the patient's pathology.     We reviewed the treatment of this disease condition.  In light of the chronicity of symptoms, loss of normal function, and  failure to progress conservatively we recommend an MRI to evaluate the integrity of the patient's scapular pain and soft tissue mass. The patient will follow up after imaging.     External records were also reviewed for pertinent historical findings contributing to the patients undiagnosed new problem with uncertain prognosis.     The patient had the opportunity to ask questions, and all questions were answered appropriately.         FOLLOW-UP:   Return to clinic following completion of MRI to review scan and findings.           Medina SANCHES  BRAXTON Snow, PAShahnazC Orthopedic Surgery / Sports Medicine Specialist  [facility]  [address] [place] [id number]   [email or web address]  [email or web address]  t: [phone]  o: [phone]  f: [phone]    This note was dictated using Dragon software.  While it was briefly proofread prior to completion, some grammatical, spelling, and word choice errors due to dictation may still occur.

## 2024-10-04 ENCOUNTER — OFFICE VISIT (OUTPATIENT)
Dept: FAMILY MEDICINE CLINIC | Facility: CLINIC | Age: 29
End: 2024-10-04
Payer: COMMERCIAL

## 2024-10-04 VITALS
HEIGHT: 70 IN | BODY MASS INDEX: 25.77 KG/M2 | SYSTOLIC BLOOD PRESSURE: 110 MMHG | HEART RATE: 97 BPM | DIASTOLIC BLOOD PRESSURE: 70 MMHG | WEIGHT: 180 LBS | OXYGEN SATURATION: 98 %

## 2024-10-04 DIAGNOSIS — Z86.2 HISTORY OF ANEMIA: ICD-10-CM

## 2024-10-04 DIAGNOSIS — Z00.00 LABORATORY EXAM ORDERED AS PART OF ROUTINE GENERAL MEDICAL EXAMINATION: ICD-10-CM

## 2024-10-04 DIAGNOSIS — N92.0 MENORRHAGIA WITH REGULAR CYCLE: Primary | ICD-10-CM

## 2024-10-04 PROCEDURE — 3074F SYST BP LT 130 MM HG: CPT | Performed by: FAMILY MEDICINE

## 2024-10-04 PROCEDURE — 99214 OFFICE O/P EST MOD 30 MIN: CPT | Performed by: FAMILY MEDICINE

## 2024-10-04 PROCEDURE — 3078F DIAST BP <80 MM HG: CPT | Performed by: FAMILY MEDICINE

## 2024-10-04 PROCEDURE — 3008F BODY MASS INDEX DOCD: CPT | Performed by: FAMILY MEDICINE

## 2024-10-04 NOTE — PROGRESS NOTES
Subjective:   Gloria Vasquez is a 29 year old female who presents for New Patient (Here establish care /Would like to talk to you about some gyne issues )     H/o uterine bleeding/abnormalities     OB History    Para Term  AB Living   3 3 3 0 0 3   SAB IAB Ectopic Multiple Live Births   0 0 0 0 3        Heavy menses   Postpartum hemorrhage with each pregnancy   Retained placentas with each pregnancy - had 2 failed attempts to remove retained placenta after delivery of first child.   Heavy periods since first pregnancy.   Denies epistaxis. Has had some gum bleeding but only with routine dental cleanings   Denies excessive bleeding with normal injuries/lacerations     Regular exercise   Healthy diet     Mood is ok, better in last 2 weeks   No longer breastfeeding     History/Other:    Chief Complaint Reviewed and Verified  Nursing Notes Reviewed and   Verified  Tobacco Reviewed  Allergies Reviewed  Medications Reviewed    Problem List Reviewed  Medical History Reviewed  Surgical History   Reviewed  OB Status Reviewed  Family History Reviewed  Social History   Reviewed         Tobacco:  She has never smoked tobacco.    Current Outpatient Medications   Medication Sig Dispense Refill    Meloxicam 15 MG Oral Tab Take 1 tablet (15 mg total) by mouth daily. 14 tablet 1    Cholecalciferol (VITAMIN D3) 1.25 MG (87956 UT) Oral Cap Take 1 capsule (50,000 Units total) by mouth daily.      ferrous sulfate 325 (65 FE) MG Oral Tab EC Take 1 tablet (325 mg total) by mouth daily with breakfast.      Linoleic Acid-Sunflower Oil 500-1000 MG Oral Cap Take 2,400 mg by mouth in the morning and 2,400 mg before bedtime.      Norethindrone 0.35 MG Oral Tab Take 1 tablet (0.35 mg total) by mouth daily.      magnesium 250 MG Oral Tab Take 1 tablet (250 mg total) by mouth.      Prenatal Vit-Fe Fum-FA-Omega (PNV PRENATAL PLUS MULTIVIT+DHA) 27-1 & 312 MG Oral Misc Take 1 tablet by mouth daily. 90 each 0          Review of Systems:  Review of Systems   Constitutional:  Negative for chills and fever.   HENT:  Negative for rhinorrhea and sinus pressure.    Eyes:  Negative for pain and visual disturbance.   Respiratory:  Negative for cough and shortness of breath.    Cardiovascular:  Negative for chest pain and palpitations.   Gastrointestinal:  Negative for abdominal pain, nausea and vomiting.   Genitourinary:  Negative for dysuria, frequency and urgency.   Musculoskeletal:  Negative for arthralgias and myalgias.   Skin:  Negative for pallor and rash.   Neurological:  Negative for dizziness and headaches.       Objective:   /70   Pulse 97   Ht 5' 10\" (1.778 m)   Wt 180 lb (81.6 kg)   LMP 10/03/2024   SpO2 98%   Breastfeeding No   BMI 25.83 kg/m²  Estimated body mass index is 25.83 kg/m² as calculated from the following:    Height as of this encounter: 5' 10\" (1.778 m).    Weight as of this encounter: 180 lb (81.6 kg).  Physical Exam  Constitutional:       Appearance: Normal appearance. She is well-developed and well-groomed.   Cardiovascular:      Rate and Rhythm: Normal rate and regular rhythm.   Pulmonary:      Effort: Pulmonary effort is normal.      Breath sounds: Normal breath sounds.   Neurological:      Mental Status: She is alert and oriented to person, place, and time.   Psychiatric:         Mood and Affect: Mood and affect normal.         Behavior: Behavior is cooperative.           Assessment & Plan:   1. Menorrhagia with regular cycle (Primary)  -     Oncology/Hematology Referral - Adrian ([place])  -     CBC With Differential With Platelet; Future; Expected date: 10/04/2024  -     TSH W Reflex To Free T4; Future; Expected date: 10/04/2024  2. History of anemia  -     Iron And Tibc; Future; Expected date: 10/04/2024  -     Ferritin; Future; Expected date: 10/04/2024  3. Laboratory exam ordered as part of routine general medical examination  -     Comp Metabolic Panel (14); Future; Expected  date: 10/04/2024  -     Lipid Panel; Future; Expected date: 10/04/2024    Would like to consider tranexamic acid for menorrhagia as she would prefer to avoid OCP for now. Refer to heme to rule out bleeding disorder       Return in about 6 months (around 4/4/2025) for annual physical.    POLO ROYAL MD, 10/4/2024, 9:54 AM

## 2024-10-15 ENCOUNTER — PATIENT MESSAGE (OUTPATIENT)
Dept: ORTHOPEDICS CLINIC | Facility: CLINIC | Age: 29
End: 2024-10-15

## 2024-10-15 ENCOUNTER — OFFICE VISIT (OUTPATIENT)
Dept: ORTHOPEDICS CLINIC | Facility: CLINIC | Age: 29
End: 2024-10-15
Payer: COMMERCIAL

## 2024-10-15 DIAGNOSIS — G89.29 CHRONIC SCAPULAR PAIN: Primary | ICD-10-CM

## 2024-10-15 DIAGNOSIS — M75.42 ROTATOR CUFF IMPINGEMENT SYNDROME OF LEFT SHOULDER: ICD-10-CM

## 2024-10-15 DIAGNOSIS — M89.8X1 CHRONIC SCAPULAR PAIN: Primary | ICD-10-CM

## 2024-10-15 PROCEDURE — 99213 OFFICE O/P EST LOW 20 MIN: CPT | Performed by: PHYSICIAN ASSISTANT

## 2024-10-15 NOTE — PROGRESS NOTES
[facility] - ORTHOPEDICS  [address] [place] [id number]  [phone]       Name: Gloria Vasquez   MRN: [id number]  Date: 10/15/2024     REASON FOR VISIT: Follow up for left scapular pain requiring advanced imaging after failing conservative treatment to evaluate soft tissue mass.     INTERVAL HISTORY:  Gloria Vasquez is a 29 year old female who returns for evaluation of  left scapular pain requiring advanced imaging after failing conservative treatment to evaluate soft tissue mass. At the patient's last visit we recommended an MRI.     ROS: ROS    PE:   There were no vitals filed for this visit.  Estimated body mass index is 25.83 kg/m² as calculated from the following:    Height as of 10/4/24: 5' 10\" (1.778 m).    Weight as of 10/4/24: 180 lb (81.6 kg).    Physical Exam  Constitutional:       Appearance: Normal appearance.   HENT:      Head: Normocephalic and atraumatic.   Eyes:      Extraocular Movements: Extraocular movements intact.   Neck:      Musculoskeletal: Normal range of motion and neck supple.   Cardiovascular:      Pulses: Normal pulses.   Pulmonary:      Effort: Pulmonary effort is normal. No respiratory distress.   Abdominal:      General: There is no distension.   Skin:     General: Skin is warm.      Capillary Refill: Capillary refill takes less than 2 seconds.      Findings: No bruising.   Neurological:      General: No focal deficit present.      Mental Status: She is alert.   Psychiatric:         Mood and Affect: Mood normal.     Examination of the left shoulder demonstrates:      Skin is intact, warm and dry.   Cervical:  Full ROM  Spurling's                           Negative     Deformity:                         none  Atrophy:                             none    Scapular winging:            Negative      Palpation:                            AC Joint                              Negative  Biceps Tendon                  Negative  Greater Tuberosity           Negative   3+ posterior scapular pain      ROM:   Forward Flexion:              full and symmetric  Abduction:                         full and symmetric  External Rotation:           full and symmetric  Internal Rotation:            full and symmetric     Rotator Cuff Strength:   Supraspinatus:                  5/5  Subscapularis:                   5/5  Infraspinatus/Teres:        5/5     Provocative Tests:   Morgan:                            Negative  Speed's:                             Negative  Archuleta's:                           Negative  Lift-off:                 Negative  Apprehension:                  Negative  Sulcus Sign:                        Negative     Neurovascular Upper Extremity (Bilateral)  Motor:                                5/5 EPL, Finger Abduction, , Pinch, Deltoid  Sensation:                          intact to light touch median, ulnar, radial and axillary nerve  Circulation:                        Normal, 2+ radial pulse     The contralateral upper extremity is without limitation in range of motion or strength, no positive provocative maneuvers.     Radiographic Examination/Diagnostics:    I personally viewed, independently interpreted and radiology report was reviewed.    xander Insight MRI SHOULDER, LEFT (CPT=73221)    Result Date: 10/11/2024  Exam: MRI SHOULDER LT W/O CONTRAST CPT Code(s): 67246 - MRI JOINT UPR EXTREM W/O DYE EXAM: MRI left shoulder without contrast 10/11/2024. COMPARISON:  Left shoulder radiographs 10/1/2024.. INDICATION:  Soft tissue mass. Chronic scapular pain. Left shoulder pain, unspecified chronicity. Left shoulder burning/pain. Injured working out. Per clinical note, scapular pain, sensation of the bulge/soft tissue mass. TECHNIQUE:  Multiplanar multisequence MR images of the left shoulder were acquired on a wide bore ultra high field 3.0 T Siemens Skyra MR scanner without intravenous contrast. FINDINGS:  Mild edema within the infraspinatus and  subscapularis tendons is compatible tendinosis. The supraspinatus and teres minor tendons appear intact. The intra-articular and extra-articular segments of the long head of the biceps tendon appear intact. No evidence of rotator cuff tear. No significant fatty atrophy of the visualized musculature. No significant glenohumeral joint effusion. The glenohumeral articular cartilage appears within normal limits. No discrete fluid-filled labral tear is identified within the limitations of nonarthrographic technique. No acute fracture. A small focus of sclerosis within the inferior glenoid is most compatible with a benign bone island. No appreciable abnormality of the scapula, although incompletely visualized at its inferiormost extent. No discrete underlying mass. Minimal fluid/edema in the subacromial subdeltoid bursal space. The acromioclavicular joint appears within normal limits. Type II acromion. The coracoclavicular ligament appears intact. Several nonspecific prominent axillary lymph nodes are not pathologically enlarged. IMPRESSION: 1. Mild infraspinatus and subscapularis tendinosis. 2. Minimal subacromial subdeltoid bursitis. This report was performed utilizing speech recognition software technology. Despite thorough proofreading, speech recognition errors could escape detection. If a word or phrase is confusing or out of context, please do not hesitate to call for clarification. Interpreting Radiologist: Nguyen Chirinos M.D. Electronically Signed: 10/11/2024 08:56 AM    XR SHOULDER, COMPLETE (MIN 2 VIEWS), LEFT (CPT=73030)    Result Date: 10/1/2024  PROCEDURE:  XR SHOULDER, COMPLETE (MIN 2 VIEWS), LEFT (CPT=73030)  TECHNIQUE:  Multiple views were obtained.  COMPARISON:  None.  INDICATIONS:  M25.512 Left shoulder pain, unspecified chronicity  PATIENT STATED HISTORY: (As transcribed by Technologist)  Ortho evaluation. Patient states she has a sharp/burning left posterior shoulder pain that radiates to the anterior  aspect for 4 months. Denies any injury, adds that she does exercise.    FINDINGS:  There is no acute fracture.  The glenohumeral joint and acromioclavicular joint are intact.  Soft tissues are intact and unremarkable.            CONCLUSION:  Unremarkable radiographs of left shoulder.   LOCATION:  [facility]   Dictated by (CST): Diogenes Wakefield MD on 10/01/2024 at 8:06 AM     Finalized by (CST): Diogenes Wakefield MD on 10/01/2024 at 8:06 AM         IMPRESSION: Gloria Vasquez is a 29 year old female who presented for follow up of left shoulder rotator cuff tendinosis, and impingement with chronic scapular pain - attributed to soft tissue mass.     PLAN:   We had a detailed discussion outlining the etiology, anatomy, pathophysiology, and natural history of the patient's findings.    We reviewed the treatment of this disease condition.  Her MRI was not conclusive of a soft tissue/muscular abnormality. We will go forward with ordering a scapular MRI given her MRI of the shoulder. In addition, I recommend consultation with Dr. Micha Mike for trigger point injections.     In light of the chronicity of symptoms, loss of normal function, and  failure to progress conservatively we recommend an MRI to evaluate the scapula. The patient will follow up after imaging.     External records were also reviewed for pertinent historical findings contributing to the patients undiagnosed new problem with uncertain prognosis.     I spent 20 minutes in preparation to see the patient, counseling/education of relevant pathology, discussing imaging results, ordering therapy  intervention, care coordination, and documentation into the electronic medical record.    The patient had opportunity to ask questions and all questions were answered appropriately.    FOLLOW-UP:  Return to clinic following completion of MRI to review scan and findings.             Medina Snow, [facility], PA-C Orthopedic Surgery / Sports Medicine Specialist  [facility]  Orthopaedic Surgery  [address] [place] [id number]   [email or web address]  [email or web address]  t: [phone]  o: [phone]  f: [phone]    This note was dictated using Dragon software.  While it was briefly proofread prior to completion, some grammatical, spelling, and word choice errors due to dictation may still occur.

## 2024-11-04 ENCOUNTER — OFFICE VISIT (OUTPATIENT)
Dept: FAMILY MEDICINE CLINIC | Facility: CLINIC | Age: 29
End: 2024-11-04
Payer: COMMERCIAL

## 2024-11-04 ENCOUNTER — LAB ENCOUNTER (OUTPATIENT)
Dept: LAB | Age: 29
End: 2024-11-04
Attending: FAMILY MEDICINE
Payer: COMMERCIAL

## 2024-11-04 VITALS
HEART RATE: 76 BPM | HEIGHT: 70 IN | DIASTOLIC BLOOD PRESSURE: 70 MMHG | WEIGHT: 176 LBS | SYSTOLIC BLOOD PRESSURE: 120 MMHG | BODY MASS INDEX: 25.2 KG/M2 | OXYGEN SATURATION: 98 %

## 2024-11-04 DIAGNOSIS — Z86.2 HISTORY OF ANEMIA: ICD-10-CM

## 2024-11-04 DIAGNOSIS — E61.1 IRON DEFICIENCY: ICD-10-CM

## 2024-11-04 DIAGNOSIS — N92.0 MENORRHAGIA WITH REGULAR CYCLE: ICD-10-CM

## 2024-11-04 DIAGNOSIS — R11.0 NAUSEA: Primary | ICD-10-CM

## 2024-11-04 DIAGNOSIS — Z00.00 LABORATORY EXAM ORDERED AS PART OF ROUTINE GENERAL MEDICAL EXAMINATION: ICD-10-CM

## 2024-11-04 DIAGNOSIS — R42 LIGHTHEADEDNESS: ICD-10-CM

## 2024-11-04 LAB
ALBUMIN SERPL-MCNC: 4.3 G/DL (ref 3.2–4.8)
ALBUMIN/GLOB SERPL: 1.7 {RATIO} (ref 1–2)
ALP LIVER SERPL-CCNC: 73 U/L
ALT SERPL-CCNC: 15 U/L
ANION GAP SERPL CALC-SCNC: 2 MMOL/L (ref 0–18)
AST SERPL-CCNC: 22 U/L (ref ?–34)
BASOPHILS # BLD AUTO: 0.04 X10(3) UL (ref 0–0.2)
BASOPHILS NFR BLD AUTO: 1.2 %
BILIRUB SERPL-MCNC: 0.4 MG/DL (ref 0.3–1.2)
BUN BLD-MCNC: 16 MG/DL (ref 9–23)
CALCIUM BLD-MCNC: 9.8 MG/DL (ref 8.7–10.4)
CHLORIDE SERPL-SCNC: 108 MMOL/L (ref 98–112)
CHOLEST SERPL-MCNC: 115 MG/DL (ref ?–200)
CO2 SERPL-SCNC: 28 MMOL/L (ref 21–32)
CREAT BLD-MCNC: 0.87 MG/DL
DEPRECATED HBV CORE AB SER IA-ACNC: 23 NG/ML
EGFRCR SERPLBLD CKD-EPI 2021: 92 ML/MIN/1.73M2 (ref 60–?)
EOSINOPHIL # BLD AUTO: 0.07 X10(3) UL (ref 0–0.7)
EOSINOPHIL NFR BLD AUTO: 2.2 %
ERYTHROCYTE [DISTWIDTH] IN BLOOD BY AUTOMATED COUNT: 12.4 %
FASTING PATIENT LIPID ANSWER: YES
FASTING STATUS PATIENT QL REPORTED: YES
GLOBULIN PLAS-MCNC: 2.5 G/DL (ref 2–3.5)
GLUCOSE BLD-MCNC: 92 MG/DL (ref 70–99)
HCT VFR BLD AUTO: 39.3 %
HDLC SERPL-MCNC: 44 MG/DL (ref 40–59)
HGB BLD-MCNC: 13 G/DL
IMM GRANULOCYTES # BLD AUTO: 0.01 X10(3) UL (ref 0–1)
IMM GRANULOCYTES NFR BLD: 0.3 %
IRON SATN MFR SERPL: 23 %
IRON SERPL-MCNC: 68 UG/DL
LDLC SERPL CALC-MCNC: 58 MG/DL (ref ?–100)
LYMPHOCYTES # BLD AUTO: 1.2 X10(3) UL (ref 1–4)
LYMPHOCYTES NFR BLD AUTO: 36.9 %
MCH RBC QN AUTO: 30 PG (ref 26–34)
MCHC RBC AUTO-ENTMCNC: 33.1 G/DL (ref 31–37)
MCV RBC AUTO: 90.8 FL
MONOCYTES # BLD AUTO: 0.31 X10(3) UL (ref 0.1–1)
MONOCYTES NFR BLD AUTO: 9.5 %
NEUTROPHILS # BLD AUTO: 1.62 X10 (3) UL (ref 1.5–7.7)
NEUTROPHILS # BLD AUTO: 1.62 X10(3) UL (ref 1.5–7.7)
NEUTROPHILS NFR BLD AUTO: 49.9 %
NONHDLC SERPL-MCNC: 71 MG/DL (ref ?–130)
OSMOLALITY SERPL CALC.SUM OF ELEC: 287 MOSM/KG (ref 275–295)
PLATELET # BLD AUTO: 180 10(3)UL (ref 150–450)
POTASSIUM SERPL-SCNC: 4.2 MMOL/L (ref 3.5–5.1)
PROT SERPL-MCNC: 6.8 G/DL (ref 5.7–8.2)
RBC # BLD AUTO: 4.33 X10(6)UL
SODIUM SERPL-SCNC: 138 MMOL/L (ref 136–145)
TOTAL IRON BINDING CAPACITY: 292 UG/DL (ref 250–425)
TRANSFERRIN SERPL-MCNC: 215 MG/DL (ref 250–380)
TRIGL SERPL-MCNC: 55 MG/DL (ref 30–149)
VLDLC SERPL CALC-MCNC: 8 MG/DL (ref 0–30)
WBC # BLD AUTO: 3.3 X10(3) UL (ref 4–11)

## 2024-11-04 PROCEDURE — 80061 LIPID PANEL: CPT | Performed by: FAMILY MEDICINE

## 2024-11-04 PROCEDURE — 3008F BODY MASS INDEX DOCD: CPT | Performed by: FAMILY MEDICINE

## 2024-11-04 PROCEDURE — 99214 OFFICE O/P EST MOD 30 MIN: CPT | Performed by: FAMILY MEDICINE

## 2024-11-04 PROCEDURE — 83540 ASSAY OF IRON: CPT | Performed by: FAMILY MEDICINE

## 2024-11-04 PROCEDURE — 3078F DIAST BP <80 MM HG: CPT | Performed by: FAMILY MEDICINE

## 2024-11-04 PROCEDURE — 85025 COMPLETE CBC W/AUTO DIFF WBC: CPT | Performed by: FAMILY MEDICINE

## 2024-11-04 PROCEDURE — 80053 COMPREHEN METABOLIC PANEL: CPT | Performed by: FAMILY MEDICINE

## 2024-11-04 PROCEDURE — 82728 ASSAY OF FERRITIN: CPT | Performed by: FAMILY MEDICINE

## 2024-11-04 PROCEDURE — 3074F SYST BP LT 130 MM HG: CPT | Performed by: FAMILY MEDICINE

## 2024-11-04 PROCEDURE — 83550 IRON BINDING TEST: CPT | Performed by: FAMILY MEDICINE

## 2024-11-04 RX ORDER — ONDANSETRON 4 MG/1
4 TABLET, ORALLY DISINTEGRATING ORAL EVERY 8 HOURS PRN
Qty: 20 TABLET | Refills: 1 | Status: SHIPPED | OUTPATIENT
Start: 2024-11-04

## 2024-11-04 NOTE — PATIENT INSTRUCTIONS
For dizziness/hydration   Try sugar free gatorade, Liquid IV or Nuum tablets     Can try meclizine (Antivert) - otc motion sickness/vertigo medication   Or use zofran

## 2024-11-04 NOTE — PROGRESS NOTES
Subjective:   Gloria Vasquez is a 29 year old female who presents for Follow - Up (Patient has been on her period for two weeks now she feels very nauseous and fatigue . Also is having stomach pain with dizziness/Patient is leaving out of town for a trip wants to make sure she is okay . /)         Persistent nausea with abrupt onset for 2 weeks. Followed by dizziness   Episodic; spontaneous, standing does worsen it. Describes Lightheadedness   Decreased appetite   LMP 10/23/24. Has seen gyn and hysteroscopy scheduled for later this week.   Has h/o iron deficiency - has been taking ferrous sulfate every other day for 6 months and last week increased to every day.     Constipation, hard stool started about 1w ago. Complains of generalized abd pain, bloating. Urine hcg negative.   States 2 of 3 kids had isolated 24h gastroenteritis sx about 1 week ago. No one at home with similar sx         History/Other:    Chief Complaint Reviewed and Verified  Nursing Notes Reviewed and   Verified  Tobacco Reviewed  Allergies Reviewed  Medications Reviewed    Problem List Reviewed  Medical History Reviewed  Surgical History   Reviewed  Family History Reviewed  Social History Reviewed         Tobacco:  She has never smoked tobacco.    Current Outpatient Medications   Medication Sig Dispense Refill    ondansetron 4 MG Oral Tablet Dispersible Take 1 tablet (4 mg total) by mouth every 8 (eight) hours as needed for Nausea. 20 tablet 1    Meloxicam 15 MG Oral Tab Take 1 tablet (15 mg total) by mouth daily. 14 tablet 1    Cholecalciferol (VITAMIN D3) 1.25 MG (09513 UT) Oral Cap Take 1 capsule (50,000 Units total) by mouth daily.      ferrous sulfate 325 (65 FE) MG Oral Tab EC Take 1 tablet (325 mg total) by mouth daily with breakfast.           Review of Systems:  Review of Systems   Constitutional:  Negative for chills, diaphoresis, fever and unexpected weight change.   HENT:  Negative for congestion, rhinorrhea, sinus  pressure, sinus pain and sore throat.    Eyes:  Negative for photophobia, pain, discharge and visual disturbance.   Respiratory:  Negative for chest tightness, shortness of breath and wheezing.    Cardiovascular:  Negative for chest pain, palpitations and leg swelling.   Gastrointestinal:  Negative for blood in stool and diarrhea.   Endocrine: Negative for polydipsia and polyphagia.   Genitourinary:  Negative for difficulty urinating, dysuria, flank pain, frequency and urgency.   Neurological:  Negative for tremors, speech difficulty, weakness, numbness and headaches.         Objective:   /70   Pulse 76   Ht 5' 10\" (1.778 m)   Wt 176 lb (79.8 kg)   LMP 10/03/2024   SpO2 98%   BMI 25.25 kg/m²  Estimated body mass index is 25.25 kg/m² as calculated from the following:    Height as of this encounter: 5' 10\" (1.778 m).    Weight as of this encounter: 176 lb (79.8 kg).  Physical Exam  Constitutional:       General: She is not in acute distress.     Appearance: Normal appearance. She is not ill-appearing, toxic-appearing or diaphoretic.   HENT:      Mouth/Throat:      Mouth: Mucous membranes are dry.      Pharynx: No oropharyngeal exudate or posterior oropharyngeal erythema.   Eyes:      General: No scleral icterus.     Extraocular Movements: Extraocular movements intact.      Right eye: No nystagmus.      Left eye: No nystagmus.      Conjunctiva/sclera: Conjunctivae normal.      Pupils: Pupils are equal, round, and reactive to light.   Cardiovascular:      Rate and Rhythm: Normal rate and regular rhythm.   Pulmonary:      Effort: Pulmonary effort is normal.      Breath sounds: Normal breath sounds.   Abdominal:      General: Abdomen is flat. Bowel sounds are normal.      Palpations: Abdomen is soft. There is no mass.      Tenderness: There is no abdominal tenderness. There is no right CVA tenderness, left CVA tenderness or guarding.   Skin:     Findings: No rash.   Neurological:      General: No focal deficit  present.      Mental Status: She is alert and oriented to person, place, and time.   Psychiatric:         Mood and Affect: Mood normal.         Behavior: Behavior normal.           Assessment & Plan:   1. Nausea (Primary)  -     Ondansetron; Take 1 tablet (4 mg total) by mouth every 8 (eight) hours as needed for Nausea.  Dispense: 20 tablet; Refill: 1  2. Lightheadedness  3. Iron deficiency    Nausea possibly from iron supplement in addition to menorrhagia   Reviewed labs from last week - no significant concerns except low wbc and neutrophils - will recheck today   Exam does not reveal an focal pathology. Cont work up with gyn. Discussed supportive care measures including meclizine vs zofran, fluids, and rest         Return if symptoms worsen or fail to improve.    POLO ROYAL MD, 11/4/2024, 11:42 AM

## 2024-11-20 ENCOUNTER — PATIENT MESSAGE (OUTPATIENT)
Dept: ORTHOPEDICS CLINIC | Facility: CLINIC | Age: 29
End: 2024-11-20

## 2024-11-26 ENCOUNTER — OFFICE VISIT (OUTPATIENT)
Dept: PHYSICAL MEDICINE AND REHAB | Facility: CLINIC | Age: 29
End: 2024-11-26
Payer: COMMERCIAL

## 2024-11-26 ENCOUNTER — TELEPHONE (OUTPATIENT)
Dept: PHYSICAL MEDICINE AND REHAB | Facility: CLINIC | Age: 29
End: 2024-11-26

## 2024-11-26 VITALS — HEIGHT: 70 IN | WEIGHT: 170 LBS | BODY MASS INDEX: 24.34 KG/M2

## 2024-11-26 DIAGNOSIS — M89.8X1 PAIN OF LEFT SCAPULA: ICD-10-CM

## 2024-11-26 DIAGNOSIS — M79.18 MYOFASCIAL PAIN: Primary | ICD-10-CM

## 2024-11-26 DIAGNOSIS — M54.12 CERVICAL RADICULOPATHY: ICD-10-CM

## 2024-11-26 DIAGNOSIS — F40.240 CLAUSTROPHOBIA: ICD-10-CM

## 2024-11-26 PROCEDURE — 99204 OFFICE O/P NEW MOD 45 MIN: CPT | Performed by: PHYSICAL MEDICINE & REHABILITATION

## 2024-11-26 PROCEDURE — 20552 NJX 1/MLT TRIGGER POINT 1/2: CPT | Performed by: PHYSICAL MEDICINE & REHABILITATION

## 2024-11-26 PROCEDURE — 3008F BODY MASS INDEX DOCD: CPT | Performed by: PHYSICAL MEDICINE & REHABILITATION

## 2024-11-26 RX ORDER — LIDOCAINE HYDROCHLORIDE 10 MG/ML
2 INJECTION, SOLUTION INFILTRATION; PERINEURAL ONCE
Status: COMPLETED | OUTPATIENT
Start: 2024-11-26 | End: 2024-11-26

## 2024-11-26 RX ORDER — DIAZEPAM 5 MG/1
TABLET ORAL
Qty: 3 TABLET | Refills: 0 | Status: SHIPPED | OUTPATIENT
Start: 2024-11-26

## 2024-11-26 RX ORDER — TRIAMCINOLONE ACETONIDE 40 MG/ML
20 INJECTION, SUSPENSION INTRA-ARTICULAR; INTRAMUSCULAR ONCE
Status: COMPLETED | OUTPATIENT
Start: 2024-11-26 | End: 2024-11-26

## 2024-11-26 RX ADMIN — TRIAMCINOLONE ACETONIDE 20 MG: 40 INJECTION, SUSPENSION INTRA-ARTICULAR; INTRAMUSCULAR at 13:25:00

## 2024-11-26 RX ADMIN — LIDOCAINE HYDROCHLORIDE 2 ML: 10 INJECTION, SOLUTION INFILTRATION; PERINEURAL at 13:25:00

## 2024-11-26 NOTE — TELEPHONE ENCOUNTER
Initiated authorization for Trigger point injection left rhomboid. CPT/HCPCS 56233, 23876,  dx:M79.18 with Availity  Completed in the office today    Status: Approved - no action required  Reference/Authorization # [id number]  Authorization is not required based on medical necessity when being performed, however is not a guarantee of payment and may be subject to review once claim is submitted

## 2024-11-26 NOTE — PROGRESS NOTES
[facility]  Progress Note    CHIEF COMPLAINT:    Chief Complaint   Patient presents with    Shoulder Pain     New right handed patient c/o burning constant pain in her left upper back near her shoulder blade. Started about 6 months ago without injury, has xray 10/1/24 and MRI 10/11/24. Denies N/T. Meloxicam and trigger point inj, 12 weeks of PT without relief. LOP 2/10       History of Present Illness:  Gloria Vasquez is a 29 year old female who is being seen in consultation at the request of Medina Snow.  She has a chief complaint of left interscapular pain for about 6 months.  She had extensive physical therapy.  At 1 point the physical therapist thought she might have a cyst.  She had a shoulder MRI showing tendinosis.  A scapular MRI has been ordered but not performed.  She obtained a trigger point injection with a friend of hers at [facility].  This was done over the posterior humeral head area and did not help.  When she stretches her arm forward in front of her, she notices it most.  Hard to determine whether arm overhead is better or worse.  Hard to determine whether cervical range of motion makes symptoms better or worse.  Denies numbness or tingling, denies weakness in the arm.  She has seen chiropractors in the past who told her posture is terrible.    PAST MEDICAL HISTORY:  Past Medical History:    Anemia    Pneumonia    Retained portions of placenta or amniotic membrane after delivery (HCC)    Formatting of this note might be different from the original. Added automatically from request for surgery [id number]    Retained products of conception after delivery with complications (HCC)    Formatting of this note might be different from the original. Added automatically from request for surgery [id number]    Retained products of conception after delivery without hemorrhage (HCC)    Rh negative status during pregnancy (HCC)    Repeat antibody screen at 27-28 wks  Rhogam IM within 6 days of labs        SURGICAL HISTORY:  Past Surgical History:   Procedure Laterality Date      2024    Dr Macdonald    D & c  3 in , 1 in     Retained plscenta after both babies. Took 3x with baby 1, 1 time with baby 2    Foot surgery  2012    Hysteroscopy  2020    possible retained products of conception    Hysteroscopy  2024    Dr Pozo      Aug 2020 ans May 2022    Retained placenta and surgery to repair hematoma on stitch line. 4 D&Cs    Other  2020    Obstetrical Laceration Revision and Evacuation of Hematoma    Other surgical history      Hymenectomy, wisdom teeth, tear repair post childbirth    Partial removal of hymen      [place] teeth removed         SOCIAL HISTORY:   Social History     Occupational History    Not on file   Tobacco Use    Smoking status: Never    Smokeless tobacco: Never   Vaping Use    Vaping status: Never Used   Substance and Sexual Activity    Alcohol use: Yes     Alcohol/week: 2.0 standard drinks of alcohol     Types: 1 Glasses of wine, 1 Cans of beer per week     Comment: 1-2 wine/beer weekly    Drug use: Never    Sexual activity: Yes     Partners: Male     Birth control/protection: Condom       CURRENT MEDICATIONS:   Current Outpatient Medications   Medication Sig Dispense Refill    ondansetron 4 MG Oral Tablet Dispersible Take 1 tablet (4 mg total) by mouth every 8 (eight) hours as needed for Nausea. 20 tablet 1    Meloxicam 15 MG Oral Tab Take 1 tablet (15 mg total) by mouth daily. 14 tablet 1    Cholecalciferol (VITAMIN D3) 1.25 MG (27044 UT) Oral Cap Take 1 capsule (50,000 Units total) by mouth daily.      ferrous sulfate 325 (65 FE) MG Oral Tab EC Take 1 tablet (325 mg total) by mouth daily with breakfast.         ALLERGIES:   Allergies[1]    REVIEW OF SYSTEMS:   No patient-reported data collected this visit.            PHYSICAL EXAM:   Ht 70\"   Wt 170 lb (77.1 kg)   LMP 10/03/2024   BMI 24.39 kg/m²      Body mass index is 24.39 kg/m².      General: No immediate distress  Head: Normocephalic/ Atraumatic  Extremities: No upper extremity edema bilaterally. Peripheral pulses intact.  Spine:  full and painfree cervical ROM in all directions, tender over the right rhomboid  Shoulders: full and painfree ROM, negative impingement signs  Neuro:   Cognition: alert & oriented x 3, attentive, able to follow 2 step commands, comprehention intact, spontaneous speech intact  Strength: Upper extremities have 5/5 strength, no focal strength deficits on the left  Sensation: Normal upper extremities to light touch  Reflexes: Normal upper extremities  Spurling's sign: Spurling's position to the left may aggravate the scapula.  Spurling's position to the right seems to alleviate it    Data    Radiology Imagin.  I personally reviewed a plain film x-ray of the left shoulder which was unremarkable.  2.  I personally reviewed a left shoulder MRI thickening of the subscap tendon and slight signal increase at the musculocutaneous junction of the supraspinatus.  No atrophy of the periscapular musculature or deltoid.    ASSESSMENT AND PLAN:  1. Myofascial pain  Seems to be tender over the rhomboid.  Will perform a trigger point injection over the rhomboid today.  If no better obtain cervical MRI.  - SPECIALTY (OTHER) - EXTERNAL    2. Pain of left scapula  Atypical cervical radiculopathy?  Will obtain an MRI if no better  - MRI SPINE CERVICAL (CPT=72141); Future    3. Cervical radiculopathy  No weakness or numbness but Spurling sign recreates symptoms.  - MRI SPINE CERVICAL (CPT=72141); Future        The patient was in agreement with the assessment and plan.  All questions were answered.         Micha Mike MD  Physical Medicine and Rehabilitation/Sports Medicine  [facility]           [1] No Known Allergies

## 2024-11-26 NOTE — PROCEDURES
Trigger point injection  Location:  left rhomboid  After discussing benefits and possible side effects, we proceeded with a trigger point injection. The patient was consented.  The patient was seated, and the muscle was palpated. The skin was sterilely prepped. Using aseptic technique and a fanning motion, a total of 0.5 cc of 40 mg/ml Kenalog and 2 cc of 1% lidocaine was injected into each trigger point.     The patient tolerated the procedure well without adverse effects.

## 2024-12-15 ENCOUNTER — OFFICE VISIT (OUTPATIENT)
Dept: FAMILY MEDICINE CLINIC | Facility: CLINIC | Age: 29
End: 2024-12-15
Payer: COMMERCIAL

## 2024-12-15 VITALS
BODY MASS INDEX: 25 KG/M2 | WEIGHT: 177.56 LBS | HEART RATE: 111 BPM | OXYGEN SATURATION: 97 % | SYSTOLIC BLOOD PRESSURE: 122 MMHG | RESPIRATION RATE: 18 BRPM | TEMPERATURE: 99 F | DIASTOLIC BLOOD PRESSURE: 78 MMHG

## 2024-12-15 DIAGNOSIS — R50.9 FEVER, UNSPECIFIED FEVER CAUSE: Primary | ICD-10-CM

## 2024-12-15 PROCEDURE — 87637 SARSCOV2&INF A&B&RSV AMP PRB: CPT | Performed by: NURSE PRACTITIONER

## 2024-12-15 PROCEDURE — 3078F DIAST BP <80 MM HG: CPT | Performed by: NURSE PRACTITIONER

## 2024-12-15 PROCEDURE — 81514 NFCT DS BV&VAGINITIS DNA ALG: CPT | Performed by: NURSE PRACTITIONER

## 2024-12-15 PROCEDURE — 99213 OFFICE O/P EST LOW 20 MIN: CPT | Performed by: NURSE PRACTITIONER

## 2024-12-15 PROCEDURE — 3074F SYST BP LT 130 MM HG: CPT | Performed by: NURSE PRACTITIONER

## 2024-12-15 NOTE — PROGRESS NOTES
CHIEF COMPLAINT:     Chief Complaint   Patient presents with    Body ache and/or chills     Shaky, congestion and nausea s/s for 4 days.  OTC meds taken.         HPI:   Gloria Vasquez is a 29 year old female who presents for upper respiratory symptoms for  6 days. Patient reports congestion, low grade fever, body aches . Symptoms have been stable but not improving since onset.  Treating symptoms with tylenol and IBU last dose last evening.   Associated symptoms include none.  Of note, patient had a hysteroscopy on 11/29 with polyp removal.  Daily vaginal bleeding since procedure, NW aware per pt.  c/o vaginal discharge with odor today.  Denies pelvic pain or lower back pain or fevers.    Current Outpatient Medications   Medication Sig Dispense Refill    diazePAM 5 MG Oral Tab 1 tablet night before procedure, 1 tablet 1 hour before procedure, 1 tablet 1/2-hour before procedure 3 tablet 0    ondansetron 4 MG Oral Tablet Dispersible Take 1 tablet (4 mg total) by mouth every 8 (eight) hours as needed for Nausea. 20 tablet 1    Meloxicam 15 MG Oral Tab Take 1 tablet (15 mg total) by mouth daily. 14 tablet 1    Cholecalciferol (VITAMIN D3) 1.25 MG (46480 UT) Oral Cap Take 1 capsule (50,000 Units total) by mouth daily.      ferrous sulfate 325 (65 FE) MG Oral Tab EC Take 1 tablet (325 mg total) by mouth daily with breakfast.        Past Medical History:    Anemia    Pneumonia    Retained portions of placenta or amniotic membrane after delivery ([facility])    Formatting of this note might be different from the original. Added automatically from request for surgery [id number]    Retained products of conception after delivery with complications ([facility])    Formatting of this note might be different from the original. Added automatically from request for surgery [id number]    Retained products of conception after delivery without hemorrhage ([facility])    Rh negative status during pregnancy ([facility])    Repeat antibody screen at 27-28 wks Rhogam  IM within 6 days of labs       Past Surgical History:   Procedure Laterality Date      2024    Dr Macdonald    D & c  3 in , 1 in     Retained plscenta after both babies. Took 3x with baby 1, 1 time with baby 2    Foot surgery  2012    Hysteroscopy  2020    possible retained products of conception    Hysteroscopy  2024    Dr Pozo      Aug 2020 ans May 2022    Retained placenta and surgery to repair hematoma on stitch line. 4 D&Cs    Other  2020    Obstetrical Laceration Revision and Evacuation of Hematoma    Other surgical history      Hymenectomy, wisdom teeth, tear repair post childbirth    Partial removal of hymen      [place] teeth removed           Social History     Socioeconomic History    Marital status:    Tobacco Use    Smoking status: Never    Smokeless tobacco: Never   Vaping Use    Vaping status: Never Used   Substance and Sexual Activity    Alcohol use: Yes     Alcohol/week: 2.0 standard drinks of alcohol     Types: 1 Glasses of wine, 1 Cans of beer per week     Comment: 1-2 wine/beer weekly    Drug use: Never    Sexual activity: Yes     Partners: Male     Birth control/protection: Condom   Other Topics Concern    Caffeine Concern No    Exercise Yes    Seat Belt Yes    Special Diet No    Stress Concern No    Weight Concern Yes     Comment: Trying to lose postpartum weight     Social Drivers of Health     Financial Resource Strain: Low Risk  (2024)    Financial Resource Strain     Difficulty of Paying Living Expenses: Not hard at all     Med Affordability: No   Food Insecurity: No Food Insecurity (2024)    Food Insecurity     Food Insecurity: Never true   Transportation Needs: No Transportation Needs (2024)    Transportation Needs     Lack of Transportation: No   Stress: No Stress Concern Present (2024)    Stress     Feeling of Stress : No   Housing Stability: Low Risk  (2024)    Housing Stability     Housing Instability:  No         REVIEW OF SYSTEMS:   GENERAL: normal appetite  SKIN: no rashes or abnormal skin lesions  HEENT: See HPI  LUNGS: denies shortness of breath or wheezing, See HPI  CARDIOVASCULAR: denies chest pain or palpitations   GI: denies N/V/C or abdominal pain  NEURO: Denies headaches    EXAM:   /78   Pulse 111   Temp 99 °F (37.2 °C) (Oral)   Resp 18   Wt 177 lb 9.3 oz (80.5 kg)   LMP 11/24/2024 (Approximate)   SpO2 97%   BMI 25.48 kg/m²   GENERAL: well developed, well nourished,in no apparent distress  SKIN: no rashes,no suspicious lesions  HEAD: atraumatic, normocephalic.  no tenderness on palpation of  sinuses  EYES: conjunctiva clear, EOM intact  EARS: TM's intact, no bulging, no retraction,no fluid, bony landmarks visualized  NOSE: Nostrils patent, no nasal discharge, nasal mucosa pink   THROAT: Oral mucosa pink, moist. Posterior pharynx is not erythematous. no exudates. Tonsils 1/4.    NECK: Supple, non-tender  LUNGS: clear to auscultation bilaterally, no wheezes or rhonchi. Breathing is non labored.  CARDIO: RRR without murmur  GI: active BS's x4,no masses, hepatosplenomegaly, or tenderness on direct palpation  EXTREMITIES: no cyanosis, clubbing or edema  LYMPH:  no lymphadenopathy.    Pelvic exam:   - External genitalia: normal, white vaginal discharge with odor and bleeding at cervix, Cervix visualized - no lesions,. No cervical motion tenderness. Foreign body in vaginal vault: no.Uterus midline and non-tender.    - Swabs from vagina and endocervix taken: yes          ASSESSMENT AND PLAN:   Gloria Vasquez is a 29 year old female who presents with upper respiratory symptoms that are consistent with    ASSESSMENT:   Encounter Diagnosis   Name Primary?    Fever, unspecified fever cause Yes       PLAN: Flu quad panel sent at patient request.  Patient understands she is outside treatment window for influenza, support care recommended such as increase fluid intake and tyelnol and IBU.  Vaginitis  panel sent, will await results and treat and needed.  Patient will follow up with PCP tomorrow and [facility] tomorrow for further recommendations.    The patient indicates understanding of these issues and agrees to the plan.  The patient is asked to return if sx's persist or worsen.

## 2024-12-16 ENCOUNTER — PATIENT MESSAGE (OUTPATIENT)
Dept: FAMILY MEDICINE CLINIC | Facility: CLINIC | Age: 29
End: 2024-12-16

## 2024-12-16 LAB
BV BACTERIA DNA VAG QL NAA+PROBE: NEGATIVE
C GLABRATA DNA VAG QL NAA+PROBE: NEGATIVE
C KRUSEI DNA VAG QL NAA+PROBE: NEGATIVE
CANDIDA DNA VAG QL NAA+PROBE: NEGATIVE
FLUAV + FLUBV RNA SPEC NAA+PROBE: NOT DETECTED
FLUAV + FLUBV RNA SPEC NAA+PROBE: NOT DETECTED
RSV RNA SPEC NAA+PROBE: NOT DETECTED
SARS-COV-2 RNA RESP QL NAA+PROBE: NOT DETECTED
T VAGINALIS DNA VAG QL NAA+PROBE: NEGATIVE

## 2024-12-18 ENCOUNTER — NURSE TRIAGE (OUTPATIENT)
Dept: FAMILY MEDICINE CLINIC | Facility: CLINIC | Age: 29
End: 2024-12-18

## 2024-12-18 ENCOUNTER — OFFICE VISIT (OUTPATIENT)
Dept: FAMILY MEDICINE CLINIC | Facility: CLINIC | Age: 29
End: 2024-12-18
Payer: COMMERCIAL

## 2024-12-18 VITALS
WEIGHT: 175 LBS | OXYGEN SATURATION: 98 % | TEMPERATURE: 98 F | HEIGHT: 70 IN | RESPIRATION RATE: 18 BRPM | DIASTOLIC BLOOD PRESSURE: 64 MMHG | BODY MASS INDEX: 25.05 KG/M2 | SYSTOLIC BLOOD PRESSURE: 104 MMHG | HEART RATE: 80 BPM

## 2024-12-18 DIAGNOSIS — R05.2 SUBACUTE COUGH: ICD-10-CM

## 2024-12-18 DIAGNOSIS — R06.2 WHEEZING: Primary | ICD-10-CM

## 2024-12-18 DIAGNOSIS — M25.59 PAIN IN OTHER JOINT: ICD-10-CM

## 2024-12-18 DIAGNOSIS — J06.9 UPPER RESPIRATORY TRACT INFECTION, UNSPECIFIED TYPE: ICD-10-CM

## 2024-12-18 PROCEDURE — 3074F SYST BP LT 130 MM HG: CPT

## 2024-12-18 PROCEDURE — 3008F BODY MASS INDEX DOCD: CPT

## 2024-12-18 PROCEDURE — 3078F DIAST BP <80 MM HG: CPT

## 2024-12-18 PROCEDURE — 99213 OFFICE O/P EST LOW 20 MIN: CPT

## 2024-12-18 RX ORDER — PREDNISONE 10 MG/1
20 TABLET ORAL DAILY
Qty: 14 TABLET | Refills: 0 | Status: CANCELLED | OUTPATIENT
Start: 2024-12-18

## 2024-12-18 RX ORDER — PREDNISONE 20 MG/1
20 TABLET ORAL DAILY
Qty: 7 TABLET | Refills: 0 | Status: SHIPPED | OUTPATIENT
Start: 2024-12-18 | End: 2024-12-25

## 2024-12-18 RX ORDER — ALBUTEROL SULFATE 90 UG/1
2 INHALANT RESPIRATORY (INHALATION) EVERY 6 HOURS PRN
Qty: 18 G | Refills: 0 | Status: SHIPPED | OUTPATIENT
Start: 2024-12-18

## 2024-12-18 NOTE — TELEPHONE ENCOUNTER
Action Requested: Summary for Provider     []  Critical Lab, Recommendations Needed  [] Need Additional Advice  [x]   FYI    []   Need Orders  [] Need Medications Sent to Pharmacy  []  Other     SUMMARY: RN scheduled pt for OV evaluation with SAMANTA Fraire as MD Abhi schedule is full.    Reason for call: No chief complaint on file.  Onset: Last week.    Notes:  - 12/15 [facility] for cough/crackling sound when she exhales, all testing was ok.  - Symptoms started Sunday, has not improved.  - Recent hx of PNA, does not feel quite as she did when she had PNA but she is concerned this may turn into PNA.  - Also having shooting pain to different joints when she lays down at night, read about \"viral arthritis\" online.  - Denies: wheezing/SOB, fever, hemoptysis.   - Cough starts out dry/scratchy/n-p, the turns into strong/wet/productive (thick, pale green). Once she gets started coughing she can't stop for a few minutes.  - No longer feeling hot/cold as she did prior.    Future Appointments   Date Time Provider Department Center   12/18/2024  3:00 PM Antony Fraire PA-C [facility]       Reason for Disposition  • Patient wants to be seen    Protocols used: Cough-A-[place]

## 2024-12-18 NOTE — PROGRESS NOTES
[facility] Family Medicine Office Note  Chief Complaint:   Chief Complaint   Patient presents with    Follow - Up     F/u [facility] 12/15/24, cough, crackling sound when I breathe out in my lungs, sharp stabbing joint and muscle pain from my fingers down to my feet, sporadically       HPI:   Gloria Vasquez is a 29 year old female coming in for a cough that has been going on since last week.  She went to the walk-in clinic on 12/15/2024 due to having cough  and crackling sound when she exhales. The walk-in clinic diagnosed her with a fever.  Was negative for covid, influenza, and RSV at the walk-in clinic    Has hx of pneumonia with hospitalization before    Today she reports she is still coughing sporadically throughout the day, but coughs a lot more during the night.  She reports that the cough is productive at times with some mucus.  For relief she has tried DayQuil, Robitussin, acetaminophen, and ibuprofen, but they all have not helped.  Reports having a fever when she visited the walk-in clinic but has not had a fever since.  Denies any shortness of breath, denies any chest pain.    She also reports that she is having some random pain in her joints.  She describes the random joint pain as \"zaps of lightening\" and are worse when she is sleeping at night. Occurs in thighs and fingers. She is having trouble sleeping because of the random joint pain.  She thinks it is due to viral arthritis.    Past Medical History:    Anemia    Pneumonia    Retained portions of placenta or amniotic membrane after delivery ([facility])    Formatting of this note might be different from the original. Added automatically from request for surgery [id number]    Retained products of conception after delivery with complications ([facility])    Formatting of this note might be different from the original. Added automatically from request for surgery [id number]    Retained products of conception after delivery without hemorrhage ([facility])    Rh negative  status during pregnancy ([facility])    Repeat antibody screen at 27-28 wks Rhogam IM within 6 days of labs      Past Surgical History:   Procedure Laterality Date      2024    Dr Macdonald    D & c  3 in , 1 in     Retained plscenta after both babies. Took 3x with baby 1, 1 time with baby 2    Foot surgery  2012    Hysteroscopy  2020    possible retained products of conception    Hysteroscopy  2024    Dr Pozo      Aug 2020 ans May 2022    Retained placenta and surgery to repair hematoma on stitch line. 4 D&Cs    Other  2020    Obstetrical Laceration Revision and Evacuation of Hematoma    Other surgical history      Hymenectomy, wisdom teeth, tear repair post childbirth    Partial removal of hymen      [place] teeth removed       Social History:  Social History     Socioeconomic History    Marital status:    Tobacco Use    Smoking status: Never    Smokeless tobacco: Never   Vaping Use    Vaping status: Never Used   Substance and Sexual Activity    Alcohol use: Yes     Alcohol/week: 2.0 standard drinks of alcohol     Types: 1 Glasses of wine, 1 Cans of beer per week     Comment: 1-2 wine/beer weekly    Drug use: Never    Sexual activity: Yes     Partners: Male     Birth control/protection: Condom   Other Topics Concern    Caffeine Concern No    Exercise Yes    Seat Belt Yes    Special Diet No    Stress Concern No    Weight Concern Yes     Comment: Trying to lose postpartum weight     Social Drivers of Health     Financial Resource Strain: Low Risk  (2024)    Financial Resource Strain     Difficulty of Paying Living Expenses: Not hard at all     Med Affordability: No   Food Insecurity: No Food Insecurity (2024)    Food Insecurity     Food Insecurity: Never true   Transportation Needs: No Transportation Needs (2024)    Transportation Needs     Lack of Transportation: No   Stress: No Stress Concern Present (2024)    Stress     Feeling of Stress : No    Housing Stability: Low Risk  (1/2/2024)    Housing Stability     Housing Instability: No     Family History:  Family History   Problem Relation Age of Onset    Skin cancer Mother     Heart Disorder Mother         Mom, grandma, great grandla maternal uncles all with sudden electrical heart problems- 4 resulted in death. Theyve done studies on my family but dont know whats happening. I see a cardiologist.    Cancer Mother         Skin    Anemia Mother     Other (Electrical Herat Issues) Mother     Prostate Cancer Father     Heart Disease Maternal Grandmother         passed away suddenly when heart stopped per patient    Arrhythmia Maternal Grandmother     Arrhythmia Maternal Grandfather     Other (Lung Cancer) Paternal Grandfather     Arrhythmia Maternal Uncle     Arrhythmia Maternal Uncle     Skin cancer Other      Allergies:  No Known Allergies  Current Meds:  Current Outpatient Medications   Medication Sig Dispense Refill    Pseudoephedrine-DM-GG (ROBITUSSIN CF OR) Take by mouth.      Acetaminophen (TYLENOL 8 HOUR OR) Take by mouth.      NON FORMULARY Pill for heavy periods      albuterol 108 (90 Base) MCG/ACT Inhalation Aero Soln Inhale 2 puffs into the lungs every 6 (six) hours as needed for Wheezing or Shortness of Breath. 18 g 0    predniSONE 20 MG Oral Tab Take 1 tablet (20 mg total) by mouth daily for 7 days. 7 tablet 0    Meloxicam 15 MG Oral Tab Take 1 tablet (15 mg total) by mouth daily. 14 tablet 1    Cholecalciferol (VITAMIN D3) 1.25 MG (76694 UT) Oral Cap Take 1 capsule (50,000 Units total) by mouth daily.      ferrous sulfate 325 (65 FE) MG Oral Tab EC Take 1 tablet (325 mg total) by mouth daily with breakfast.        Counseling given: Not Answered       REVIEW OF SYSTEMS:   Review of Systems   Constitutional:  Negative for chills, diaphoresis, fatigue, fever and unexpected weight change.   HENT:  Negative for congestion, postnasal drip, rhinorrhea, sinus pressure, sinus pain, sneezing and sore  throat.    Eyes:  Negative for pain.   Respiratory:  Positive for cough. Negative for choking, chest tightness and shortness of breath.         See hpi   Cardiovascular:  Negative for chest pain and palpitations.   Gastrointestinal:  Negative for abdominal pain.   Musculoskeletal:  Positive for arthralgias. Negative for back pain, joint swelling, myalgias, neck pain and neck stiffness.   Skin:  Negative for rash.   Neurological:  Negative for dizziness, syncope, weakness and headaches.        EXAM:   /64   Pulse 80   Temp 98.3 °F (36.8 °C)   Resp 18   Ht 5' 10\" (1.778 m)   Wt 175 lb (79.4 kg)   LMP 12/15/2024 (Approximate)   SpO2 98%   BMI 25.11 kg/m²  Estimated body mass index is 25.11 kg/m² as calculated from the following:    Height as of this encounter: 5' 10\" (1.778 m).    Weight as of this encounter: 175 lb (79.4 kg).   Vital signs reviewed.Appears stated age, well groomed.  Physical Exam  Constitutional:       Appearance: Normal appearance. She is normal weight.   HENT:      Head: Normocephalic and atraumatic.      Right Ear: Tympanic membrane and ear canal normal.      Left Ear: Tympanic membrane and ear canal normal.      Nose: Nose normal.      Mouth/Throat:      Mouth: Mucous membranes are moist.      Pharynx: Oropharynx is clear.   Eyes:      Extraocular Movements: Extraocular movements intact.      Conjunctiva/sclera: Conjunctivae normal.      Pupils: Pupils are equal, round, and reactive to light.   Cardiovascular:      Rate and Rhythm: Normal rate and regular rhythm.      Pulses: Normal pulses.   Pulmonary:      Effort: Pulmonary effort is normal.      Breath sounds: No stridor. Wheezing present. No rhonchi or rales.      Comments: Wheezing heard during exhalation only  Chest:      Chest wall: No tenderness.   Abdominal:      General: Abdomen is flat.      Palpations: Abdomen is soft.   Musculoskeletal:         General: No swelling, tenderness, deformity or signs of injury. Normal range  of motion.      Cervical back: Normal range of motion.   Skin:     General: Skin is warm and dry.      Capillary Refill: Capillary refill takes less than 2 seconds.      Findings: No erythema or rash.   Neurological:      Mental Status: She is oriented to person, place, and time.          ASSESSMENT AND PLAN:   1. Upper respiratory tract infection, unspecified type  I advised Gloria that she most likely has a upper respiratory viral infection given her symptoms are getting better throughout the week. I advised comfort relief such as using multiple pillows when sleeping at night to allow better airflow to lungs and prevent buildup of mucus. I advised her that if her symptoms worsen or do not improve in 7 days, a chest XR and antibiotic may be needed.    2. Subacute cough  Advised to continue using Robitussin DM for cough. Prescribed albuterol to use as needed if SOB develops.  - albuterol 108 (90 Base) MCG/ACT Inhalation Aero Soln; Inhale 2 puffs into the lungs every 6 (six) hours as needed for Wheezing or Shortness of Breath.  Dispense: 18 g; Refill: 0    3. Wheezing  Prednisone 20mg prescribed today due to hearing wheezing on exhalation during physical exam. Have low to moderate suspicion of asthma due to nighttime awaking and cough.  Chest x-ray not ordered today due to Gloria not having any shortness of breath and cough seems only to be worse at night when she is laying down.  - albuterol 108 (90 Base) MCG/ACT Inhalation Aero Soln; Inhale 2 puffs into the lungs every 6 (six) hours as needed for Wheezing or Shortness of Breath.  Dispense: 18 g; Refill: 0  - predniSONE 20 MG Oral Tab; Take 1 tablet (20 mg total) by mouth daily for 7 days.  Dispense: 7 tablet; Refill: 0    4. Pain in other joint  Advised Gloria that I have low suspicion for viral arthritis given she has no specific joint swelling or erythema on the physical exam today.  Furthermore these jolts of pain that she has sound like it is more of a neurological  symptom.  I am unsure is related to her's current symptoms of a cough and wheezing on exhalation, but advised her to monitor the pain symptoms and to inform our office if they do not resolve after the 7 days of taking the prednisone.  The prednisone does have anti-inflammatory effects which could help this pain condition.  I would consider placing a referral to neurology if Gloria reaches out to her office stating that she still has the jolts of pain after the 7 days      Meds, Orders, & Refills for this Visit:  Requested Prescriptions     Signed Prescriptions Disp Refills    albuterol 108 (90 Base) MCG/ACT Inhalation Aero Soln 18 g 0     Sig: Inhale 2 puffs into the lungs every 6 (six) hours as needed for Wheezing or Shortness of Breath.    predniSONE 20 MG Oral Tab 7 tablet 0     Sig: Take 1 tablet (20 mg total) by mouth daily for 7 days.       Orders Placed This Encounter    Pseudoephedrine-DM-GG (ROBITUSSIN CF OR)     Sig: Take by mouth.    Acetaminophen (TYLENOL 8 HOUR OR)     Sig: Take by mouth.    NON FORMULARY     Sig: Pill for heavy periods    albuterol 108 (90 Base) MCG/ACT Inhalation Aero Soln     Sig: Inhale 2 puffs into the lungs every 6 (six) hours as needed for Wheezing or Shortness of Breath.     Dispense:  18 g     Refill:  0    predniSONE 20 MG Oral Tab     Sig: Take 1 tablet (20 mg total) by mouth daily for 7 days.     Dispense:  7 tablet     Refill:  0        Health Maintenance:  There are no preventive care reminders to display for this patient.    Patient/Caregiver Education: Patient/Caregiver Education: There are no barriers to learning. Medical education done.   Outcome: Gloria Vasquez verbalizes understanding, agrees with the plan, and had no other questions at the end of today's visit. Gloria Vasquez is informed to call with any questions, complications, allergies, or worsening or changing symptoms.  Gloria Vasquez is to call with any side effects or complications from the  treatments as a result of today.     Problem List:  Patient Active Problem List   Diagnosis    Rh negative status during pregnancy ([facility]CC)    Coccydynia    Bruxism    Bilateral temporomandibular joint pain

## 2025-02-07 ENCOUNTER — E-ADVICE (OUTPATIENT)
Dept: PHYSICAL THERAPY | Age: 30
End: 2025-02-07

## 2025-02-07 DIAGNOSIS — M53.3 COCCYXDYNIA: Primary | ICD-10-CM

## 2025-02-27 ENCOUNTER — APPOINTMENT (OUTPATIENT)
Dept: PHYSICAL THERAPY | Age: 30
End: 2025-02-27

## 2025-02-27 DIAGNOSIS — M53.3 COCCYODYNIA: ICD-10-CM

## 2025-02-27 DIAGNOSIS — M62.89 MUSCLE TIGHTNESS: Primary | ICD-10-CM

## 2025-02-27 ASSESSMENT — ENCOUNTER SYMPTOMS
ALLEVIATING FACTORS: CHANGE IN POSITION
QUALITY: SORE
PAIN FREQUENCY: INTERMITTENT
QUALITY: ACHE
SUBJECTIVE PAIN PROGRESSION: WORSENING
PAIN SEVERITY NOW: 3
PAIN SCALE AT HIGHEST: 7
PAIN LOCATION: COCCYX

## 2025-03-04 ENCOUNTER — E-ADVICE (OUTPATIENT)
Dept: PHYSICAL THERAPY | Age: 30
End: 2025-03-04

## 2025-03-10 ENCOUNTER — APPOINTMENT (OUTPATIENT)
Dept: PHYSICAL THERAPY | Age: 30
End: 2025-03-10

## 2025-03-10 DIAGNOSIS — M53.3 COCCYODYNIA: ICD-10-CM

## 2025-03-10 DIAGNOSIS — M62.89 MUSCLE TIGHTNESS: Primary | ICD-10-CM

## 2025-03-10 PROCEDURE — 97110 THERAPEUTIC EXERCISES: CPT | Performed by: PHYSICAL THERAPIST

## 2025-03-10 PROCEDURE — 97140 MANUAL THERAPY 1/> REGIONS: CPT | Performed by: PHYSICAL THERAPIST

## 2025-03-17 ENCOUNTER — APPOINTMENT (OUTPATIENT)
Dept: PHYSICAL THERAPY | Age: 30
End: 2025-03-17

## 2025-03-17 DIAGNOSIS — M62.89 MUSCLE TIGHTNESS: Primary | ICD-10-CM

## 2025-03-17 DIAGNOSIS — M53.3 COCCYODYNIA: ICD-10-CM

## 2025-03-17 PROCEDURE — 97112 NEUROMUSCULAR REEDUCATION: CPT | Performed by: PHYSICAL THERAPIST

## 2025-03-17 PROCEDURE — 97140 MANUAL THERAPY 1/> REGIONS: CPT | Performed by: PHYSICAL THERAPIST

## 2025-03-17 PROCEDURE — 97110 THERAPEUTIC EXERCISES: CPT | Performed by: PHYSICAL THERAPIST

## 2025-03-24 ENCOUNTER — APPOINTMENT (OUTPATIENT)
Dept: PHYSICAL THERAPY | Age: 30
End: 2025-03-24

## 2025-03-24 DIAGNOSIS — M53.3 COCCYODYNIA: ICD-10-CM

## 2025-03-24 DIAGNOSIS — M62.89 MUSCLE TIGHTNESS: Primary | ICD-10-CM

## 2025-03-24 PROCEDURE — 97110 THERAPEUTIC EXERCISES: CPT | Performed by: PHYSICAL THERAPIST

## 2025-03-24 PROCEDURE — 97112 NEUROMUSCULAR REEDUCATION: CPT | Performed by: PHYSICAL THERAPIST

## 2025-03-24 PROCEDURE — 97140 MANUAL THERAPY 1/> REGIONS: CPT | Performed by: PHYSICAL THERAPIST

## 2025-03-31 ENCOUNTER — APPOINTMENT (OUTPATIENT)
Dept: PHYSICAL THERAPY | Age: 30
End: 2025-03-31

## 2025-03-31 DIAGNOSIS — M53.3 COCCYODYNIA: ICD-10-CM

## 2025-03-31 DIAGNOSIS — M62.89 MUSCLE TIGHTNESS: Primary | ICD-10-CM

## 2025-03-31 PROCEDURE — 97140 MANUAL THERAPY 1/> REGIONS: CPT | Performed by: PHYSICAL THERAPIST

## 2025-03-31 PROCEDURE — 97110 THERAPEUTIC EXERCISES: CPT | Performed by: PHYSICAL THERAPIST

## 2025-04-07 ENCOUNTER — APPOINTMENT (OUTPATIENT)
Dept: PHYSICAL THERAPY | Age: 30
End: 2025-04-07

## 2025-04-07 DIAGNOSIS — M62.89 MUSCLE TIGHTNESS: Primary | ICD-10-CM

## 2025-04-07 DIAGNOSIS — M53.3 COCCYODYNIA: ICD-10-CM

## 2025-04-07 PROCEDURE — 97112 NEUROMUSCULAR REEDUCATION: CPT | Performed by: PHYSICAL THERAPIST

## 2025-04-07 PROCEDURE — 97140 MANUAL THERAPY 1/> REGIONS: CPT | Performed by: PHYSICAL THERAPIST

## 2025-04-07 PROCEDURE — 97110 THERAPEUTIC EXERCISES: CPT | Performed by: PHYSICAL THERAPIST

## 2025-04-16 ENCOUNTER — APPOINTMENT (OUTPATIENT)
Dept: PHYSICAL THERAPY | Age: 30
End: 2025-04-16

## 2025-04-16 DIAGNOSIS — M53.3 COCCYODYNIA: ICD-10-CM

## 2025-04-16 DIAGNOSIS — M62.89 MUSCLE TIGHTNESS: Primary | ICD-10-CM

## 2025-04-16 PROCEDURE — 97140 MANUAL THERAPY 1/> REGIONS: CPT | Performed by: PHYSICAL THERAPIST

## 2025-04-16 PROCEDURE — 97110 THERAPEUTIC EXERCISES: CPT | Performed by: PHYSICAL THERAPIST

## 2025-04-23 ENCOUNTER — TELEPHONE (OUTPATIENT)
Dept: FAMILY MEDICINE CLINIC | Facility: CLINIC | Age: 30
End: 2025-04-23

## 2025-04-23 DIAGNOSIS — Z00.00 ROUTINE GENERAL MEDICAL EXAMINATION AT A HEALTH CARE FACILITY: Primary | ICD-10-CM

## 2025-04-28 ENCOUNTER — OFFICE VISIT (OUTPATIENT)
Dept: FAMILY MEDICINE CLINIC | Facility: CLINIC | Age: 30
End: 2025-04-28
Payer: COMMERCIAL

## 2025-04-28 VITALS
BODY MASS INDEX: 25.48 KG/M2 | HEART RATE: 78 BPM | WEIGHT: 178 LBS | SYSTOLIC BLOOD PRESSURE: 118 MMHG | DIASTOLIC BLOOD PRESSURE: 70 MMHG | HEIGHT: 70 IN | OXYGEN SATURATION: 99 %

## 2025-04-28 DIAGNOSIS — Z00.00 ROUTINE PHYSICAL EXAMINATION: Primary | ICD-10-CM

## 2025-04-28 DIAGNOSIS — M53.3 COCCYDYNIA: ICD-10-CM

## 2025-04-28 DIAGNOSIS — M54.32 CHRONIC SCIATICA, LEFT: ICD-10-CM

## 2025-04-28 DIAGNOSIS — Z87.440 HISTORY OF URINARY TRACT INFECTION: ICD-10-CM

## 2025-04-28 PROCEDURE — 3008F BODY MASS INDEX DOCD: CPT | Performed by: FAMILY MEDICINE

## 2025-04-28 PROCEDURE — 3078F DIAST BP <80 MM HG: CPT | Performed by: FAMILY MEDICINE

## 2025-04-28 PROCEDURE — 3074F SYST BP LT 130 MM HG: CPT | Performed by: FAMILY MEDICINE

## 2025-04-28 PROCEDURE — 99395 PREV VISIT EST AGE 18-39: CPT | Performed by: FAMILY MEDICINE

## 2025-04-28 RX ORDER — PREDNISONE 20 MG/1
40 TABLET ORAL DAILY
Qty: 10 TABLET | Refills: 0 | Status: SHIPPED | OUTPATIENT
Start: 2025-04-28 | End: 2025-05-03

## 2025-04-28 RX ORDER — PROGESTERONE 200 MG/1
200 CAPSULE ORAL NIGHTLY
COMMUNITY

## 2025-04-28 RX ORDER — CYCLOBENZAPRINE HCL 5 MG
5 TABLET ORAL 3 TIMES DAILY PRN
Qty: 30 TABLET | Refills: 0 | Status: SHIPPED | OUTPATIENT
Start: 2025-04-28

## 2025-04-28 RX ORDER — CLOMIPHENE CITRATE 50 MG/1
50 TABLET ORAL DAILY
COMMUNITY
Start: 2025-04-23

## 2025-04-28 NOTE — PROGRESS NOTES
The following individual(s) verbally consented to be recorded using ambient AI listening technology and understand that they can each withdraw their consent to this listening technology at any point by asking the clinician to turn off or pause the recording:    Patient name: Gloria Vasquez  Additional names:

## 2025-04-28 NOTE — PATIENT INSTRUCTIONS
[facility]   Physical Medicine and Rehab     [phone]    Dr. Chong Jackman  [address] [place] [id number]

## 2025-04-29 ENCOUNTER — APPOINTMENT (OUTPATIENT)
Dept: PHYSICAL THERAPY | Age: 30
End: 2025-04-29

## 2025-04-29 DIAGNOSIS — M62.89 MUSCLE TIGHTNESS: Primary | ICD-10-CM

## 2025-04-29 DIAGNOSIS — M53.3 COCCYODYNIA: ICD-10-CM

## 2025-04-29 PROCEDURE — 97140 MANUAL THERAPY 1/> REGIONS: CPT | Performed by: PHYSICAL THERAPIST

## 2025-04-29 PROCEDURE — 97110 THERAPEUTIC EXERCISES: CPT | Performed by: PHYSICAL THERAPIST

## 2025-04-30 ENCOUNTER — LAB ENCOUNTER (OUTPATIENT)
Dept: LAB | Age: 30
End: 2025-04-30
Attending: FAMILY MEDICINE
Payer: COMMERCIAL

## 2025-04-30 DIAGNOSIS — D70.9 NEUTROPENIA, UNSPECIFIED TYPE: ICD-10-CM

## 2025-04-30 DIAGNOSIS — Z00.00 ROUTINE GENERAL MEDICAL EXAMINATION AT A HEALTH CARE FACILITY: ICD-10-CM

## 2025-04-30 DIAGNOSIS — E61.1 IRON DEFICIENCY: ICD-10-CM

## 2025-04-30 LAB
ALBUMIN SERPL-MCNC: 4.3 G/DL (ref 3.2–4.8)
ALBUMIN/GLOB SERPL: 2 {RATIO} (ref 1–2)
ALP LIVER SERPL-CCNC: 48 U/L (ref 37–98)
ALT SERPL-CCNC: 12 U/L (ref 10–49)
ANION GAP SERPL CALC-SCNC: 4 MMOL/L (ref 0–18)
AST SERPL-CCNC: 20 U/L (ref ?–34)
BASOPHILS # BLD AUTO: 0.02 X10(3) UL (ref 0–0.2)
BASOPHILS NFR BLD AUTO: 1.5 %
BILIRUB SERPL-MCNC: 0.6 MG/DL (ref 0.3–1.2)
BUN BLD-MCNC: 18 MG/DL (ref 9–23)
CALCIUM BLD-MCNC: 9.2 MG/DL (ref 8.7–10.6)
CHLORIDE SERPL-SCNC: 109 MMOL/L (ref 98–112)
CHOLEST SERPL-MCNC: 117 MG/DL (ref ?–200)
CO2 SERPL-SCNC: 26 MMOL/L (ref 21–32)
CREAT BLD-MCNC: 0.93 MG/DL (ref 0.55–1.02)
DEPRECATED HBV CORE AB SER IA-ACNC: 63 NG/ML (ref 50–306)
EGFRCR SERPLBLD CKD-EPI 2021: 84 ML/MIN/1.73M2 (ref 60–?)
EOSINOPHIL # BLD AUTO: 0.06 X10(3) UL (ref 0–0.7)
EOSINOPHIL NFR BLD AUTO: 4.4 %
ERYTHROCYTE [DISTWIDTH] IN BLOOD BY AUTOMATED COUNT: 12.7 %
FASTING PATIENT LIPID ANSWER: YES
FASTING STATUS PATIENT QL REPORTED: YES
GLOBULIN PLAS-MCNC: 2.2 G/DL (ref 2–3.5)
GLUCOSE BLD-MCNC: 94 MG/DL (ref 70–99)
HCT VFR BLD AUTO: 37.6 % (ref 35–48)
HDLC SERPL-MCNC: 56 MG/DL (ref 40–59)
HGB BLD-MCNC: 12.1 G/DL (ref 12–16)
IMM GRANULOCYTES # BLD AUTO: 0 X10(3) UL (ref 0–1)
IMM GRANULOCYTES NFR BLD: 0 %
IRON SATN MFR SERPL: 86 % (ref 15–50)
IRON SERPL-MCNC: 232 UG/DL (ref 50–170)
LDLC SERPL CALC-MCNC: 48 MG/DL (ref ?–100)
LYMPHOCYTES # BLD AUTO: 0.44 X10(3) UL (ref 1–4)
LYMPHOCYTES NFR BLD AUTO: 32.6 %
MCH RBC QN AUTO: 29.2 PG (ref 26–34)
MCHC RBC AUTO-ENTMCNC: 32.2 G/DL (ref 31–37)
MCV RBC AUTO: 90.8 FL (ref 80–100)
MONOCYTES # BLD AUTO: 0.29 X10(3) UL (ref 0.1–1)
MONOCYTES NFR BLD AUTO: 21.5 %
NEUTROPHILS # BLD AUTO: 0.54 X10 (3) UL (ref 1.5–7.7)
NEUTROPHILS # BLD AUTO: 0.54 X10(3) UL (ref 1.5–7.7)
NEUTROPHILS NFR BLD AUTO: 40 %
NONHDLC SERPL-MCNC: 61 MG/DL (ref ?–130)
OSMOLALITY SERPL CALC.SUM OF ELEC: 290 MOSM/KG (ref 275–295)
PLATELET # BLD AUTO: 113 10(3)UL (ref 150–450)
POTASSIUM SERPL-SCNC: 4.3 MMOL/L (ref 3.5–5.1)
PROT SERPL-MCNC: 6.5 G/DL (ref 5.7–8.2)
RBC # BLD AUTO: 4.14 X10(6)UL (ref 3.8–5.3)
SODIUM SERPL-SCNC: 139 MMOL/L (ref 136–145)
TOTAL IRON BINDING CAPACITY: 270 UG/DL (ref 250–425)
TRANSFERRIN SERPL-MCNC: 185 MG/DL (ref 250–380)
TRIGL SERPL-MCNC: 58 MG/DL (ref 30–149)
TSI SER-ACNC: 0.9 UIU/ML (ref 0.55–4.78)
VLDLC SERPL CALC-MCNC: 8 MG/DL (ref 0–30)
WBC # BLD AUTO: 1.4 X10(3) UL (ref 4–11)

## 2025-04-30 PROCEDURE — 80050 GENERAL HEALTH PANEL: CPT | Performed by: FAMILY MEDICINE

## 2025-04-30 PROCEDURE — 82728 ASSAY OF FERRITIN: CPT | Performed by: FAMILY MEDICINE

## 2025-04-30 PROCEDURE — 83550 IRON BINDING TEST: CPT | Performed by: FAMILY MEDICINE

## 2025-04-30 PROCEDURE — 80061 LIPID PANEL: CPT | Performed by: FAMILY MEDICINE

## 2025-04-30 PROCEDURE — 83540 ASSAY OF IRON: CPT | Performed by: FAMILY MEDICINE

## 2025-04-30 NOTE — PROGRESS NOTES
Subjective:   Gloria Vasquez is a 30 year old female who presents for Physical (Reviewed Preventative/Wellness form with patient./Physical and blood work orders placed )       History/Other:   History of Present Illness       She experiences severe left-sided sciatic pain, which began during her second pregnancy. The pain is persistent, and stretching, particularly the pigeon stretch, provides some relief. She performs these stretches consistently. Her toes go numb during certain stretches, but there is no persistent numbness or weakness in the foot. She has previously used meloxicam for pain management but is concerned about its effects on her menstrual cycle. She has considered using gabapentin or a muscle relaxer for relief. An MRI conducted within the last year, after her third pregnancy, showed no abnormalities. She engages in physical therapy and core strengthening exercises to address potential core weakness post-pregnancy.    Two weeks ago, she experienced a severe urinary tract infection (UTI) that left her bedridden for a day. She emphasizes the need for immediate treatment upon symptom onset to prevent severe discomfort. She inquires about the possibility of obtaining prescriptions for UTIs through e-visits to avoid long travel for appointments.    She struggles with weight management, noting that she has been stuck in the high 170s for about five years despite daily exercise and a healthy diet. She is approximately ten pounds from her desired weight and is contemplating the use of weight loss medications but is concerned about potential side effects. She is also considering alternative approaches such as working with a .    She experiences a post-viral cough lasting up to six weeks after a cold, which is non-productive. Her immunizations are up to date, and her Pap smears have been normal throughout her lifetime.       Chief Complaint Reviewed and Verified  Nursing Notes Reviewed and    Verified  Tobacco Reviewed  Allergies Reviewed  Medications Reviewed    Problem List Reviewed  Medical History Reviewed  Surgical History   Reviewed  Family History Reviewed  Social History Reviewed         Tobacco:  She has never smoked tobacco.    Current Medications[1]      Review of Systems:  Review of Systems   Constitutional:  Negative for chills, fatigue and fever.   HENT:  Negative for hearing loss, rhinorrhea and sinus pressure.    Eyes:  Negative for visual disturbance.   Respiratory:  Negative for cough and shortness of breath.    Cardiovascular:  Negative for chest pain and palpitations.   Gastrointestinal:  Negative for abdominal pain, constipation, diarrhea, nausea and vomiting.   Genitourinary:  Negative for dysuria and frequency.   Musculoskeletal:  Positive for back pain. Negative for myalgias.   Skin:  Negative for rash.   Neurological:  Negative for dizziness, light-headedness and headaches.   Hematological:  Does not bruise/bleed easily.   Psychiatric/Behavioral:  Negative for dysphoric mood. The patient is not nervous/anxious.          Objective:   /70   Pulse 78   Ht 5' 10\" (1.778 m)   Wt 178 lb (80.7 kg)   LMP 04/18/2025 (Approximate)   SpO2 99%   BMI 25.54 kg/m²  Estimated body mass index is 25.54 kg/m² as calculated from the following:    Height as of this encounter: 5' 10\" (1.778 m).    Weight as of this encounter: 178 lb (80.7 kg).  Physical Exam  Constitutional:       General: She is not in acute distress.     Appearance: She is well-developed.   HENT:      Mouth/Throat:      Pharynx: No posterior oropharyngeal erythema.   Eyes:      General: No scleral icterus.     Conjunctiva/sclera: Conjunctivae normal.      Pupils: Pupils are equal, round, and reactive to light.   Cardiovascular:      Rate and Rhythm: Normal rate and regular rhythm.      Heart sounds: No murmur heard.  Pulmonary:      Effort: Pulmonary effort is normal.      Breath sounds: Normal breath sounds.    Abdominal:      General: Bowel sounds are normal.      Palpations: Abdomen is soft. There is no mass.      Tenderness: There is no abdominal tenderness. There is no guarding.   Musculoskeletal:      Cervical back: Neck supple.   Lymphadenopathy:      Cervical: No cervical adenopathy.   Skin:     Capillary Refill: Capillary refill takes less than 2 seconds.      Findings: No bruising or rash.   Neurological:      General: No focal deficit present.      Mental Status: She is alert and oriented to person, place, and time.   Psychiatric:         Mood and Affect: Mood normal.         Behavior: Behavior normal.         Thought Content: Thought content normal.       Results  RADIOLOGY  Lumbar spine MRI: Normal      Assessment & Plan:   1. Routine physical examination (Primary)    2. Chronic sciatica, left  -     predniSONE; Take 2 tablets (40 mg total) by mouth daily for 5 days.  Dispense: 10 tablet; Refill: 0  -     Cyclobenzaprine HCl; Take 1 tablet (5 mg total) by mouth 3 (three) times daily as needed for Muscle spasms.  Dispense: 30 tablet; Refill:   Chronic left-sided sciatica with exacerbation from activity and stretching. Normal MRI. Potential factors: leg length discrepancy, core weakness post-pregnancy. Discussed pharmacological and non-pharmacological management options.  - Prescribe prednisone and muscle relaxer for acute flare-ups.  - Recommend stretching exercises, using a lacrosse ball, and heat application.  - Provide information for referral to physical medicine specialist (PMR).    3. Coccydynia  Overview:  Status post fall approximately 2022    4. History of urinary tract infection  Recent severe UTI. Discussed management and prevention strategies including e-visits and supplements. Pyridium used for symptom relief.  - Recommend e-visits for UTI symptoms for prompt treatment.  - Suggest cranberry extract, probiotics, and D-mannose for prevention.  - Continue using Pyridium (Azo) for symptom  relief.    Other orders          No follow-ups on file.      POLO ROYAL MD, 4/29/2025, 10:48 PM              [1]   Current Outpatient Medications   Medication Sig Dispense Refill    clomiPHENE Citrate 50 MG Oral Tab Take 1 tablet (50 mg total) by mouth daily.      progesterone 200 MG Oral Cap Take 1 capsule (200 mg total) by mouth nightly.      predniSONE 20 MG Oral Tab Take 2 tablets (40 mg total) by mouth daily for 5 days. 10 tablet 0    cyclobenzaprine 5 MG Oral Tab Take 1 tablet (5 mg total) by mouth 3 (three) times daily as needed for Muscle spasms. 30 tablet 0    NON FORMULARY Pill for heavy periods      Cholecalciferol (VITAMIN D3) 1.25 MG (94209 UT) Oral Cap Take 1 capsule (50,000 Units total) by mouth daily.      ferrous sulfate 325 (65 FE) MG Oral Tab EC Take 1 tablet (325 mg total) by mouth daily with breakfast.      Magnesium-Policosanol 100-10 MG Oral Cap Take 1 tablet by mouth daily.      Pseudoephedrine-DM-GG (ROBITUSSIN CF OR) Take by mouth.      Acetaminophen (TYLENOL 8 HOUR OR) Take by mouth.      albuterol 108 (90 Base) MCG/ACT Inhalation Aero Soln Inhale 2 puffs into the lungs every 6 (six) hours as needed for Wheezing or Shortness of Breath. 18 g 0    Meloxicam 15 MG Oral Tab Take 1 tablet (15 mg total) by mouth daily. 14 tablet 1

## 2025-05-05 ENCOUNTER — TELEPHONE (OUTPATIENT)
Dept: FAMILY MEDICINE CLINIC | Facility: CLINIC | Age: 30
End: 2025-05-05

## 2025-05-05 ENCOUNTER — LAB ENCOUNTER (OUTPATIENT)
Dept: LAB | Age: 30
End: 2025-05-05
Attending: FAMILY MEDICINE
Payer: COMMERCIAL

## 2025-05-05 DIAGNOSIS — D70.9 NEUTROPENIA, UNSPECIFIED TYPE: Primary | ICD-10-CM

## 2025-05-05 DIAGNOSIS — Z86.2 HISTORY OF ANEMIA: ICD-10-CM

## 2025-05-05 DIAGNOSIS — D70.9 NEUTROPENIA, UNSPECIFIED TYPE: ICD-10-CM

## 2025-05-05 LAB
BASOPHILS # BLD AUTO: 0.04 X10(3) UL (ref 0–0.2)
BASOPHILS NFR BLD AUTO: 1 %
EOSINOPHIL # BLD AUTO: 0.08 X10(3) UL (ref 0–0.7)
EOSINOPHIL NFR BLD AUTO: 2.1 %
ERYTHROCYTE [DISTWIDTH] IN BLOOD BY AUTOMATED COUNT: 12.5 %
HCT VFR BLD AUTO: 37.2 % (ref 35–48)
HGB BLD-MCNC: 12.5 G/DL (ref 12–16)
HGB RETIC QN AUTO: 31.4 PG (ref 28.2–36.6)
IMM GRANULOCYTES # BLD AUTO: 0.01 X10(3) UL (ref 0–1)
IMM GRANULOCYTES NFR BLD: 0.3 %
IMM RETICS NFR: 0 RATIO (ref 0.1–0.3)
LYMPHOCYTES # BLD AUTO: 2.21 X10(3) UL (ref 1–4)
LYMPHOCYTES NFR BLD AUTO: 57.3 %
MCH RBC QN AUTO: 30 PG (ref 26–34)
MCHC RBC AUTO-ENTMCNC: 33.6 G/DL (ref 31–37)
MCV RBC AUTO: 89.4 FL (ref 80–100)
MONOCYTES # BLD AUTO: 0.36 X10(3) UL (ref 0.1–1)
MONOCYTES NFR BLD AUTO: 9.3 %
NEUTROPHILS # BLD AUTO: 1.16 X10 (3) UL (ref 1.5–7.7)
NEUTROPHILS # BLD AUTO: 1.16 X10(3) UL (ref 1.5–7.7)
NEUTROPHILS NFR BLD AUTO: 30 %
PLATELET # BLD AUTO: 150 10(3)UL (ref 150–450)
RBC # BLD AUTO: 4.16 X10(6)UL (ref 3.8–5.3)
RETICS # AUTO: 4.6 X10(3) UL (ref 22.5–147.5)
RETICS/RBC NFR AUTO: 0.1 % (ref 0.5–2.5)
WBC # BLD AUTO: 3.9 X10(3) UL (ref 4–11)

## 2025-05-05 PROCEDURE — 85025 COMPLETE CBC W/AUTO DIFF WBC: CPT | Performed by: FAMILY MEDICINE

## 2025-05-05 PROCEDURE — 85045 AUTOMATED RETICULOCYTE COUNT: CPT | Performed by: FAMILY MEDICINE

## 2025-05-06 ENCOUNTER — TELEPHONE (OUTPATIENT)
Age: 30
End: 2025-05-06

## 2025-05-06 ENCOUNTER — APPOINTMENT (OUTPATIENT)
Dept: PHYSICAL THERAPY | Age: 30
End: 2025-05-06

## 2025-05-06 ENCOUNTER — LAB ENCOUNTER (OUTPATIENT)
Dept: LAB | Age: 30
End: 2025-05-06
Attending: FAMILY MEDICINE
Payer: COMMERCIAL

## 2025-05-06 DIAGNOSIS — E61.1 IRON DEFICIENCY: ICD-10-CM

## 2025-05-06 DIAGNOSIS — M62.89 MUSCLE TIGHTNESS: Primary | ICD-10-CM

## 2025-05-06 DIAGNOSIS — D70.9 NEUTROPENIA, UNSPECIFIED: ICD-10-CM

## 2025-05-06 DIAGNOSIS — M53.3 COCCYODYNIA: ICD-10-CM

## 2025-05-06 DIAGNOSIS — E61.1 IRON DEFICIENCY: Primary | ICD-10-CM

## 2025-05-06 PROCEDURE — 97140 MANUAL THERAPY 1/> REGIONS: CPT | Performed by: PHYSICAL THERAPIST

## 2025-05-06 PROCEDURE — 97112 NEUROMUSCULAR REEDUCATION: CPT | Performed by: PHYSICAL THERAPIST

## 2025-05-06 PROCEDURE — 86645 CMV ANTIBODY IGM: CPT | Performed by: FAMILY MEDICINE

## 2025-05-06 PROCEDURE — 86747 PARVOVIRUS ANTIBODY: CPT | Performed by: FAMILY MEDICINE

## 2025-05-06 PROCEDURE — 86665 EPSTEIN-BARR CAPSID VCA: CPT | Performed by: FAMILY MEDICINE

## 2025-05-06 PROCEDURE — 97110 THERAPEUTIC EXERCISES: CPT | Performed by: PHYSICAL THERAPIST

## 2025-05-06 PROCEDURE — 86664 EPSTEIN-BARR NUCLEAR ANTIGEN: CPT | Performed by: FAMILY MEDICINE

## 2025-05-06 PROCEDURE — 86644 CMV ANTIBODY: CPT | Performed by: FAMILY MEDICINE

## 2025-05-06 NOTE — TELEPHONE ENCOUNTER
Called patient and reviewed results.   Patient had severe neutropenia (relatively asx except for h/o manageable fatigue). She has h/o iron deficiency anemia, has been taking iron regularly.   Compared to 5 days ago, neutropenia improving, along with plt count. But retic count very low.   She has 3 children none of whom had severe viral infection recently, but she does see chronic rhinorrhea with all 3. She herself did not have severe viral infection, but had some mild symptoms, but never seemed to get worse.     Has chronic coccydynia but no other bone pain. No fevers/chills, night sweats, unexplained weight loss.   Will check EBV, CMV, parvovirus titers. May have had recent infection.   Will refer to hematology.

## 2025-05-07 ENCOUNTER — TELEPHONE (OUTPATIENT)
Dept: FAMILY MEDICINE CLINIC | Facility: CLINIC | Age: 30
End: 2025-05-07

## 2025-05-07 ENCOUNTER — PATIENT MESSAGE (OUTPATIENT)
Dept: FAMILY MEDICINE CLINIC | Facility: CLINIC | Age: 30
End: 2025-05-07

## 2025-05-07 DIAGNOSIS — D70.9 NEUTROPENIA, UNSPECIFIED TYPE: Primary | ICD-10-CM

## 2025-05-07 DIAGNOSIS — B27.90 EBV INFECTION: ICD-10-CM

## 2025-05-07 LAB
CMV IGG AB: 5.8 U/ML
CMV IGM AB: 62.3 AU/ML
EBV NA IGG SER QL IA: POSITIVE
EBV VCA IGG SER QL IA: POSITIVE
EBV VCA IGM SER QL IA: POSITIVE

## 2025-05-07 NOTE — TELEPHONE ENCOUNTER
Patient called regarding abnormal lab results she is concerned about      Dr. Moe - please review patients labs

## 2025-05-08 ENCOUNTER — OFFICE VISIT (OUTPATIENT)
Age: 30
End: 2025-05-08
Attending: INTERNAL MEDICINE
Payer: COMMERCIAL

## 2025-05-08 VITALS
RESPIRATION RATE: 18 BRPM | HEIGHT: 69.8 IN | BODY MASS INDEX: 26.19 KG/M2 | OXYGEN SATURATION: 100 % | WEIGHT: 180.88 LBS | DIASTOLIC BLOOD PRESSURE: 76 MMHG | SYSTOLIC BLOOD PRESSURE: 125 MMHG | TEMPERATURE: 99 F | HEART RATE: 98 BPM

## 2025-05-08 DIAGNOSIS — B27.90 EBV INFECTION: Primary | ICD-10-CM

## 2025-05-08 DIAGNOSIS — D70.3 NEUTROPENIA ASSOCIATED WITH INFECTION: ICD-10-CM

## 2025-05-08 NOTE — PROGRESS NOTES
New consult for low WBC.    Denies any fevers or night sweats.  She states low WBC was found on routine physical.   She has heavy periods.   She has stopped her oral iron now because she states iron level is now elevated.     Outpatient Oncology Care Plan  Problem list:  knowledge deficit    Problems related to:    disease/disease progression    Interventions:  provided general teaching    Expected outcomes:  understands plan of care    Progress towards outcome:  making progress    Education Record    Learner:  Patient  Barriers / Limitations:  None  Method:  Brief focused  Outcome:  Shows understanding  Comments:

## 2025-05-08 NOTE — TELEPHONE ENCOUNTER
Mychart result note sent to patient yesterday.   Appears she had recent co-infection with EBV and CMV. Parvovirus still pending.   If she is pregnant - she needs to call OB asap.

## 2025-05-08 NOTE — CONSULTS
[facility] Report of Consultation    Patient Name: Gloria Vasquez   YOB: 1995   Medical Record Number: [id number]   CSN: [id number]   Consulting Physician: Gentry Trujillo M.D.   Referring Physician: No ref. provider found    Date of Consultation: 2025     Reason for Consultation (Chief Complaint):  No chief complaint on file.       History of Present Illness:  Gloria Vasquez is a 30 year old female referred for evaluation of recent leukopenia and thrombocytopenia. The patient reports that she feels well, though she has had some recent GI upset and fatigue that she attributed to taking fertility medications. She was seen by her primary for a routine visit and her CBC demonstrated a WBC 1.4, plt ct 113, and . Her lymphocyte count was low as well at 440. The counts were repeated a week later and significant recovery was noted with a WBC 3.9, ANC 1160 and normal plt and lymphocyte counts. Both CMV and EBV were positive for IgG and IgM.     She feels well today.  She has 3 children, all of whom have had viral syndromes this winter.  She denies F/C/NS. No palpable LAD. No wt loss. No abd distension.      Past Medical History:  Past Medical History[1]    Past Surgical History:  Past Surgical History[2]    Gynecologic History:  OB History    Para Term  AB Living   3 3 3 0 0 3   SAB IAB Ectopic Multiple Live Births   0 0 0 0 3       Family History:  Family History[3]    Social History:  Social History     Socioeconomic History    Marital status:      Spouse name: Not on file    Number of children: Not on file    Years of education: Not on file    Highest education level: Not on file   Occupational History    Not on file   Tobacco Use    Smoking status: Never    Smokeless tobacco: Never   Vaping Use    Vaping status: Never Used   Substance and Sexual Activity    Alcohol use: Yes     Alcohol/week: 2.0 standard drinks of alcohol     Types: 1 Glasses of wine, 1  Cans of beer per week     Comment: 1-2 wine/beer weekly    Drug use: Never    Sexual activity: Yes     Partners: Male     Birth control/protection: Condom   Other Topics Concern    Caffeine Concern No    Exercise Yes    Seat Belt Yes    Special Diet No    Stress Concern No    Weight Concern Yes     Comment: Trying to lose postpartum weight     Service Not Asked    Blood Transfusions Not Asked    Occupational Exposure Not Asked    Hobby Hazards Not Asked    Sleep Concern Not Asked    Back Care Not Asked    Bike Helmet Not Asked    Self-Exams Not Asked   Social History Narrative    Not on file     Social Drivers of Health     Food Insecurity: No Food Insecurity (4/28/2025)    NCSS - Food Insecurity     Worried About Running Out of Food in the Last Year: No     Ran Out of Food in the Last Year: No   Transportation Needs: No Transportation Needs (4/28/2025)    NCSS - Transportation     Lack of Transportation: No   Housing Stability: Not At Risk (4/28/2025)    NCSS - Housing/Utilities     Has Housing: Yes     Worried About Losing Housing: No     Unable to Get Utilities: No       Current Medications:  Medications - Current[4]    Allergies:  Allergies[5]     Review of Systems:    Constitutional No fevers, chills, night sweats, excessive fatigue or weight loss.   Eyes No significant visual difficulties. No diplopia. No yellowing of the eyes.   Hematologic/Lymphatic No easy bruising or bleeding.  No any tender or palpable lymph nodes.   Respiratory No dyspnea, Pleuritic chest pain, cough or hemoptysis.   Cardiovascular No anginal chest pain, palpitations or orthopnea.   Gastrointestinal No nausea, vomiting, diarrhea, GI bleeding, or constipation.   Genitorurinary  No hematuria, dysuria, or incontinence. No abnormal bleeding.   Integumentary No rashes or yellowing of the skin   Neurologic No headache, blurred vision, and no areas of focal weakness. Normal gait.   Psychiatric No insomnia, depression, carolann or mood  swings.         Vital Signs:  /76 (BP Location: Left arm, Patient Position: Sitting, Cuff Size: adult)   Pulse 98   Temp 98.7 °F (37.1 °C) (Temporal)   Resp 18   Ht 1.773 m (5' 9.8\")   Wt 82.1 kg (180 lb 14.4 oz)   LMP 04/18/2025 (Approximate)   SpO2 100%   BMI 26.10 kg/m²     Performance Status:  ECOG 0    Physical Examination:    Constitutional Normal - Alert, cooperative, oriented. Mood and affect appropriate. Appears close to chronological age. Well nourished. Well developed.   Eyes Normal - Conjunctivae and sclerae are clear and without icterus. Pupils are reactive and equal.   ENMT Normal - Sinuses are nontender.  No oral exudates, ulcers, masses, thrush or mucositis. Oropharynx clear.  Tongue normal.   Neck Normal - Supple without masses or thyromegaly.   Hematologic/Lymphatic Normal - No petechiae or purpura.  No tender or palpable lymph nodes in the cervical, supraclavicular, axillary or inguinal area.   Respiratory Normal - Lungs are clear to auscultation without rhonchi or wheezing.   Cardiovascular Normal - Regular rate and rhythm of heart without murmurs, gallops or rubs.   Abdomen Normal - Non-tender, non-distended, no masses, ascites or hepatosplenomegaly. Good bowel sounds. No guarding or rebound tenderness. No pulsatile masses.   Extremities Normal - No visible deformities, no cyanosis, clubbing or edema. Pulses 4+ and equal bilaterally.   Integumentary Normal - No rashes, scars, or lesions suggestive of malignancy.   Neurologic Normal - No sensory or motor deficits, normal cerebellar function, normal gait, cranial nerves intact.   Psychiatric Normal - Alert and oriented times three. Coherent speech. Verbalizes understanding of our discussions today.       Laboratory:     CMV IgM Ab 62.3 High    Comment:                                 Negative         <30.0                                  Equivocal  30.0 - 34.9                                  Positive         >34.9  A positive  result is generally indicative of acute  infection, reactivation or persistent IgM production.   CMV IgG Ab 5.80 High    Comment:                                Negative          <0.60                                 Equivocal   0.60 - 0.69                                 Positive          >0.69      [facility] Reference Range & Units 04/30/25 07:26 05/05/25 09:36 05/06/25 10:06   Teresita-Barr Virus AB IgG Negative    Positive !   TERESITA-COTTON VIRUS AB IGM Negative    Positive !   TERESITA-COTTON NUCLEAR AG ABS Negative    Positive !   WBC 4.0 - 11.0 x10(3) uL 1.4 (L) 3.9 (L)    Hemoglobin 12.0 - 16.0 g/dL 12.1 12.5    Hematocrit 35.0 - 48.0 % 37.6 37.2    Platelet Count 150.0 - 450.0 10(3)uL 113.0 (L) 150.0    RBC 3.80 - 5.30 x10(6)uL 4.14 4.16    MCH 26.0 - 34.0 pg 29.2 30.0    MCHC 31.0 - 37.0 g/dL 32.2 33.6    MCV 80.0 - 100.0 fL 90.8 89.4    RDW % 12.7 12.5    Prelim Neutrophil Abs 1.50 - 7.70 x10 (3) uL 0.54 (L) 1.16 (L)    Neutrophils Absolute 1.50 - 7.70 x10(3) uL 0.54 (L) 1.16 (L)    Lymphocytes Absolute 1.00 - 4.00 x10(3) uL 0.44 (L) 2.21    Monocytes Absolute 0.10 - 1.00 x10(3) uL 0.29 0.36    Eosinophils Absolute 0.00 - 0.70 x10(3) uL 0.06 0.08    Basophils Absolute 0.00 - 0.20 x10(3) uL 0.02 0.04    Immature Granulocyte Absolute 0.00 - 1.00 x10(3) uL 0.00 0.01    Neutrophils % % 40.0 30.0    Lymphocytes % % 32.6 57.3    Monocytes % % 21.5 9.3    Eosinophils % % 4.4 2.1    Basophils % % 1.5 1.0    Immature Granulocyte % % 0.0 0.3    RETIC% 0.5 - 2.5 %  0.1 (L)    RETIC ABSOLUTE 22.5 - 147.5 x10(3) uL  4.6 (L)    Retic IRF 0.100 - 0.300 Ratio  0.000 (L)    Reticulocyte Hemoglobin Equivalent 28.2 - 36.6 pg  31.4    !: Data is abnormal  (L): Data is abnormally low         Radiology:    Pathology:    Impression and Plan:  Leukopenia and thrombocytopenia: The patient had low counts on a draw in late April. Her counts had nearly completely recovered a week later. She has positivity of both CMV and EBV IgG and IgM.  The most likely cause of the dip in counts was a minimally symptomatic viral infection. In adults EBV and CMV infections are frequently subclinical. Her symptoms may have been attributed to her fertility meds. CMV and EBV infectious syndromes are very similar and can have cross reactivity within the testing causing false positives of one or the other. Also possible is that she had slight reactivity of one or the other when her counts were low r/t infection. Either way, recovery of her counts is very reassuring. I recommend that we repeat her counts in 2 weeks to ensure ongoing recovery. I also recommend an ultrasound of the spleen to assess for splenomegaly. This would be to advise her on activity restrictions. If her counts return to normal and her spleen is normal sized, no further hematology workup is indicated.   Regarding her CMV and EBV positivity, I recommended that, if her next pregnancy test is positive, she notify OB/Gyn.   I will reach out to ID to determine if and when her EMV and CMV testing should be repeated.     Planned Follow Up:  By phone/MyChart    I spent 45 minutes face to face with the patient.  More than 50% of that time was spent counseling the patient and/or on coordination of care.  The diagnosis, prognosis, and general treatment was explained to the patient and the family.    Risk Level: High    Electronically Signed by:    Gentry Trujillo M.D.  [facility]          [1]   Past Medical History:   Anemia    Pneumonia    Retained portions of placenta or amniotic membrane after delivery (HCC)    Formatting of this note might be different from the original. Added automatically from request for surgery [id number]    Retained products of conception after delivery with complications (HCC)    Formatting of this note might be different from the original. Added automatically from request for surgery [id number]    Retained products of conception after delivery without  hemorrhage (HCC)    Rh negative status during pregnancy (HCC)    Repeat antibody screen at 27-28 wks Rhogam IM within 6 days of labs    [2]   Past Surgical History:  Procedure Laterality Date      2024    Dr Macdonald    D & c  3 in , 1 in     Retained plscenta after both babies. Took 3x with baby 1, 1 time with baby 2    Foot surgery      Hysteroscopy  2020    possible retained products of conception    Hysteroscopy  2024    Dr Pozo      Aug 2020 ans May 2022    Retained placenta and surgery to repair hematoma on stitch line. 4 D&Cs    Other  2020    Obstetrical Laceration Revision and Evacuation of Hematoma    Other surgical history      Hymenectomy, wisdom teeth, tear repair post childbirth    Partial removal of hymen      [place] teeth removed     [3]   Family History  Problem Relation Age of Onset    Skin cancer Mother     Heart Disorder Mother         Mom, grandma, great grandla maternal uncles all with sudden electrical heart problems- 4 resulted in death. Theyve done studies on my family but dont know whats happening. I see a cardiologist.    Cancer Mother         Skin    Anemia Mother     Other (Electrical Herat Issues) Mother     Prostate Cancer Father     Heart Disease Maternal Grandmother         passed away suddenly when heart stopped per patient    Arrhythmia Maternal Grandmother     Arrhythmia Maternal Grandfather     Other (Lung Cancer) Paternal Grandfather     Arrhythmia Maternal Uncle     Arrhythmia Maternal Uncle     Skin cancer Other    [4]   Current Outpatient Medications:     clomiPHENE Citrate 50 MG Oral Tab, Take 1 tablet (50 mg total) by mouth daily., Disp: , Rfl:     Magnesium-Policosanol 100-10 MG Oral Cap, Take 1 tablet by mouth daily., Disp: , Rfl:     progesterone 200 MG Oral Cap, Take 1 capsule (200 mg total) by mouth nightly., Disp: , Rfl:     cyclobenzaprine 5 MG Oral Tab, Take 1 tablet (5 mg total) by mouth 3 (three) times  daily as needed for Muscle spasms., Disp: 30 tablet, Rfl: 0    Pseudoephedrine-DM-GG (ROBITUSSIN CF OR), Take by mouth., Disp: , Rfl:     Acetaminophen (TYLENOL 8 HOUR OR), Take by mouth., Disp: , Rfl:     NON FORMULARY, Pill for heavy periods, Disp: , Rfl:     albuterol 108 (90 Base) MCG/ACT Inhalation Aero Soln, Inhale 2 puffs into the lungs every 6 (six) hours as needed for Wheezing or Shortness of Breath., Disp: 18 g, Rfl: 0    Meloxicam 15 MG Oral Tab, Take 1 tablet (15 mg total) by mouth daily., Disp: 14 tablet, Rfl: 1    Cholecalciferol (VITAMIN D3) 1.25 MG (02942 UT) Oral Cap, Take 1 capsule (50,000 Units total) by mouth daily., Disp: , Rfl:     ferrous sulfate 325 (65 FE) MG Oral Tab EC, Take 1 tablet (325 mg total) by mouth daily with breakfast., Disp: , Rfl:   [5] No Known Allergies

## 2025-05-09 LAB
PARVO B19 IGG: 1.9 INDEX
PARVO B19 IGM: 13.7 INDEX

## 2025-05-13 ENCOUNTER — HOSPITAL ENCOUNTER (OUTPATIENT)
Dept: ULTRASOUND IMAGING | Facility: HOSPITAL | Age: 30
Discharge: HOME | End: 2025-05-13
Attending: INTERNAL MEDICINE
Payer: COMMERCIAL

## 2025-05-13 DIAGNOSIS — B27.90 EBV INFECTION: ICD-10-CM

## 2025-05-13 DIAGNOSIS — D70.3 NEUTROPENIA ASSOCIATED WITH INFECTION: ICD-10-CM

## 2025-05-13 PROCEDURE — 76705 ECHO EXAM OF ABDOMEN: CPT | Performed by: INTERNAL MEDICINE

## 2025-05-15 ENCOUNTER — APPOINTMENT (OUTPATIENT)
Dept: PHYSICAL THERAPY | Age: 30
End: 2025-05-15

## 2025-05-15 ENCOUNTER — TELEPHONE (OUTPATIENT)
Dept: FAMILY MEDICINE CLINIC | Facility: CLINIC | Age: 30
End: 2025-05-15

## 2025-05-15 DIAGNOSIS — M53.3 COCCYODYNIA: ICD-10-CM

## 2025-05-15 DIAGNOSIS — M62.89 MUSCLE TIGHTNESS: Primary | ICD-10-CM

## 2025-05-15 NOTE — TELEPHONE ENCOUNTER
Received call from patient stating she is getting some positive at home pregnancy tests at home and would like an order for a pregnancy test. Patient has MRI scheduled at [facility] at 7 am tomorrow so she would like to know her pregnancy status before that.   Advised patient that Dr. Moe is not in office today and  blood draw pregnancy test may not result by tomorrow morning, she may need to postpone her MRI. Patient decided she would like to go to walk in clinic to see if they can check pregnancy status. Advised her to call back office if anything else is needed, patient appreciative

## 2025-05-15 NOTE — TELEPHONE ENCOUNTER
Triage call transferred.   Spoke with pt f/u on status if PCP heard back from ID regarding positive tests and management.    Per pt, confirmed is not pregnant.   Informed will relay to PCP update and for further recommendations.  Pt verbalized understanding and agreed with POC.

## 2025-05-21 ENCOUNTER — APPOINTMENT (OUTPATIENT)
Dept: PHYSICAL THERAPY | Age: 30
End: 2025-05-21

## 2025-05-21 DIAGNOSIS — M53.3 COCCYODYNIA: ICD-10-CM

## 2025-05-21 DIAGNOSIS — M62.89 MUSCLE TIGHTNESS: Primary | ICD-10-CM

## 2025-05-21 PROCEDURE — 97110 THERAPEUTIC EXERCISES: CPT | Performed by: PHYSICAL THERAPIST

## 2025-05-21 PROCEDURE — 97112 NEUROMUSCULAR REEDUCATION: CPT | Performed by: PHYSICAL THERAPIST

## 2025-05-21 PROCEDURE — 97140 MANUAL THERAPY 1/> REGIONS: CPT | Performed by: PHYSICAL THERAPIST

## 2025-05-29 ENCOUNTER — APPOINTMENT (OUTPATIENT)
Dept: PHYSICAL THERAPY | Age: 30
End: 2025-05-29

## 2025-05-29 DIAGNOSIS — M53.3 COCCYODYNIA: ICD-10-CM

## 2025-05-29 DIAGNOSIS — M62.89 MUSCLE TIGHTNESS: Primary | ICD-10-CM

## 2025-05-29 PROCEDURE — 97140 MANUAL THERAPY 1/> REGIONS: CPT | Performed by: PHYSICAL THERAPIST

## 2025-05-29 PROCEDURE — 97112 NEUROMUSCULAR REEDUCATION: CPT | Performed by: PHYSICAL THERAPIST

## 2025-05-29 PROCEDURE — 97110 THERAPEUTIC EXERCISES: CPT | Performed by: PHYSICAL THERAPIST

## 2025-06-27 ENCOUNTER — TELEPHONE (OUTPATIENT)
Dept: FAMILY MEDICINE CLINIC | Facility: CLINIC | Age: 30
End: 2025-06-27

## 2025-06-27 NOTE — TELEPHONE ENCOUNTER
Had repeat labs completed in [facility]  States they were supposed to send the results to our office    Did our office receive any updated lab work for Dr. Moe to review?

## 2025-06-30 NOTE — TELEPHONE ENCOUNTER
CBC w/diff  Results were updated in care everywhere, available to be reviewed now Dr Moe. They are under [facility] 6/27/2025

## 2025-06-30 NOTE — TELEPHONE ENCOUNTER
Spoke to pt. Informed her that Dr. Moe did not receive fax labs. Inquired when she had this done. Pt states on Friday 6/27/25. Inquired if [facility] is part of [facility] [address] [facility]. Pt states she that's the company that owns them.     See CareEverywhere [facility] Results 6/27/25 CBC w/ diff. Pt states they were concerned about this. She has since moved to another town, but would prefer to complete this saga with recommendations from Dr. Moe. Please advise.

## 2025-07-01 ENCOUNTER — TELEPHONE (OUTPATIENT)
Age: 30
End: 2025-07-01

## 2025-07-01 NOTE — TELEPHONE ENCOUNTER
Patient is calling to speak with Dr. Trujillo's nurse regarding recent labs done through [facility] on 6/27/25. Patient states she wants to make sure that results are not too concerning. Please contact patient at your earliest convenience.

## 2025-07-01 NOTE — TELEPHONE ENCOUNTER
Called and spoke with patient. Notified patient of results and recommendations below from Dr. Moe. Patient wondering if hematology will be able to see this. Notified heme should be able to go through careEverywhere and see as well. Patient stated she will call heme. Appreciative of call and recommendations. Patient agreeable to plan.

## 2025-07-01 NOTE — TELEPHONE ENCOUNTER
CBC is stable.   ANC improved from early April. Similar to May  Would recommend follow up with heme/onc to see if any further testing is warranted.

## (undated) DEVICE — SOL  .9 1000ML BTL

## (undated) DEVICE — BAKRI POSTPARTUM BALLOON WITH RAPID INSTILLATION COMPONENTS: Brand: BAKRI

## (undated) DEVICE — KENDALL SCD EXPRESS SLEEVES, KNEE LENGTH, MEDIUM: Brand: KENDALL SCD

## (undated) DEVICE — SPECIMEN SOCK - STANDARD: Brand: MEDI-VAC

## (undated) DEVICE — STERILE POLYISOPRENE POWDER-FREE SURGICAL GLOVES: Brand: PROTEXIS

## (undated) DEVICE — 2000CC GUARDIAN II: Brand: GUARDIAN

## (undated) DEVICE — SUTURE VICRYL 2-0 CT-1

## (undated) DEVICE — SOL  .9 3000ML

## (undated) DEVICE — BULB SYRINGE,IRRIGATION WITH PROTECTIVE CAP: Brand: DOVER

## (undated) DEVICE — 1010 S-DRAPE TOWEL DRAPE 10/BX: Brand: STERI-DRAPE™

## (undated) DEVICE — SEAL TRUCLEAR  HYSTERSCOP

## (undated) DEVICE — GYN CDS: Brand: MEDLINE INDUSTRIES, INC.

## (undated) DEVICE — CANISTER SAFETOUCH SYST DISP

## (undated) DEVICE — OUTFLOW HYSTER S&N

## (undated) DEVICE — DEV REMOVAL TRUCLEAR SFT MINI

## (undated) DEVICE — ABSORBABLE HEMOSTAT (OXIDIZED REGENERATED CELLULOSE): Brand: SURGICEL

## (undated) DEVICE — HYSTEROSCOPIC INFLOW TUBE SET

## (undated) DEVICE — TUBING SUCTION COLLECTION SET

## (undated) DEVICE — NEEDLE SPINAL 22X3-1/2 BLK

## (undated) DEVICE — MEDI-VAC NON-CONDUCTIVE SUCTION TUBING: Brand: CARDINAL HEALTH

## (undated) DEVICE — SUTURE VICRYL 2-0 CT-2

## (undated) NOTE — LETTER
Mindy Raygoza 89  [address] [facility] [address], [address] [facility]  Authorization for Surgical Operation and Procedure     Date:___________                                                                                                         Time:__________ potential risks that can occur: fever and allergic reactions, hemolytic reactions, transmission of diseases such as Hepatitis, AIDS and Cytomegalovirus (CMV) and fluid overload.   In the event that I wish to have an autologous transfusion of my own blood, o attending physician will determine when the applicable recovery period ends for purposes of reinstating the DNAR order.   10. Patients having a sterilization procedure: I understand that if the procedure is successful the results will be permanent and it wi a. Allow the anesthesiologist (anesthesia doctor) to give me medicine and do additional procedures as necessary.  Some examples are: Starting or using an “IV” to give me medicine, fluids or blood during my procedure, and having a breathing tube placed to he 7. Regional Anesthesia (“spinal”, “epidural”, & “nerve blocks”): I understand that rare but potential complications include headache, bleeding, infection, seizure, irregular heart rhythms, and nerve injury.     I can change my mind about having anesthesia

## (undated) NOTE — LETTER
Dear New MomCami, we missed you! The nurses of [facility] have tried to reach you by phone to ask if you have any questions regarding your health or the health and care of your new little one.    We hope you are doing well. If, for any reason, you have questions or concerns about your health or your baby’s health, please contact your provider or your pediatrician or family medicine physician regarding your baby.     At [facility], we feel that postpartum support is very important for new families. Please see the enclosed new parent support flyer that lists support programs and resources with both in-person and online options.     Additionally, our Breastfeeding Centers at [facility] and [facility] in [place], offer outpatient visits with our International Board-Certified Lactation   Consultants (IBCLCs) for any breastfeeding concerns or questions you may have.    For issues related to stress, anxiety or depression, we have a Nurturing Mom support group that meets both in-person or online.  There’s also a 24-hour Mom’s Line where you can request a phone call from a clinical therapist for assistance for postpartum depression.    We encourage you to take advantage of these programs and resources as you recover from childbirth and learn to care for your new infant.    Best wishes,    [facility]            [id number]

## (undated) NOTE — LETTER
WHERE IS YOUR PAIN NOW?  Thierno the areas on your body where you feel the described sensations.  Use the appropriate symbol.  Thierno the areas of radiation.  Include all affected areas.  Just to complete the picture, please draw in the face.     ACHE:  ^ ^ ^   NUMBNESS:  0000   PINS & NEEDLES:  = = = =                              ^ ^ ^                       0000              = = = =                                    ^ ^ ^                       0000            = = = =      BURNING:  XXXX   STABBING: ////                  XXXX                ////                         XXXX          ////     Please thierno the line below indicating your degree of pain right now  with 0 being no pain 10 being the worst pain possible.                                         0             1             2              3             4              5              6              7             8             9             10         Patient Signature:

## (undated) NOTE — LETTER
Date: 2024      Patient Name: Gloria Vasquez      : 3/16/1995        Thank you for choosing [facility] as your health care provider. Your physician has deemed the following medical service(s) necessary. However, your insurance plan may not pay for all of your health care and costs and may deny payment for this service. The fact that your insurance plan does not pay for an item or service does not mean you should not receive it. The purpose of this form is to help you make an informed decision about whether or not you want to receive this service(s) that may not be paid for by your insurance plan.    CPT Code Description     Cost     Trigger point injections       I understand that the above mentioned service(s) or supply may not be covered by my insurance company. I agree to be financially responsible for the cost of this service or supply in the event of my insurance denies payment as a non-covered benefit.        ______________________________________________________________________  Signature of Patient or Patient's Representative  Relationship  Date    ______________________________________________________________________  Signature of Witness to signing of form   Printed Name

## (undated) NOTE — LETTER
AUTHORIZATION FOR SURGICAL OPERATION OR OTHER PROCEDURE    1. I hereby authorize Dr. Micha Mike and the [facility] staff assigned to my case to perform the following operation and/or procedure at the [facility]:    Trigger point injections     2.  My physician has explained the nature and purpose of the operation or other procedure, possible alternative methods of treatment, the risks involved, and the possibility of complication to me.  I acknowledge that no guarantee has been made as to the result that may be obtained.  3.  I recognize that, during the course of this operation, or other procedure, unforseen conditions may necessitate additional or different procedure than those listed above.  I, therefore, further authorize and request that the above named physician, his/her physician assistants or designees perform such procedures as are, in his/her professional opinion, necessary and desirable.  4.  Any tissue or organs removed in the operation or other procedure may be disposed of by and at the discretion of the [facility] staff and [facility].  5.  I understand that in the event of a medical emergency, I will be transported by local paramedics to [facility] or other hospital emergency department.  6.  I certify that I have read and fully understand the above consent to operation and/or other procedure.    7.  I acknowledge that my physician has explained sedation/analgesia administration to me including the risks and benefits.  I consent to the administration of sedation/analgesia as may be necessary or desirable in the judgement of my physician.    Witness signature: ___________________________________________________ Date:  ______/______/_____                    Time:  ________ A.M.  P.MGalindo Vasquez  3/16/1995  [id number]         Patient signature:  ___________________________________________________                 Statement of Physician  My signature  below affirms that prior to the time of the procedure, I have explained to the patient and/or his/her guardian, the risks and benefits involved in the proposed treatment and any reasonable alternative to the proposed treatment.  I have also explained the risks and benefits involved in the refusal of the proposed treatment and have answered the patient's questions.                        Date:  ______/______/_______  Provider                      Signature:  __________________________________________________________       Time:  ___________ A.M    P.M.